# Patient Record
Sex: FEMALE | Race: WHITE | Employment: FULL TIME
[De-identification: names, ages, dates, MRNs, and addresses within clinical notes are randomized per-mention and may not be internally consistent; named-entity substitution may affect disease eponyms.]

---

## 2017-07-29 ENCOUNTER — HEALTH MAINTENANCE LETTER (OUTPATIENT)
Age: 52
End: 2017-07-29

## 2017-11-11 ENCOUNTER — HEALTH MAINTENANCE LETTER (OUTPATIENT)
Age: 52
End: 2017-11-11

## 2018-04-02 ASSESSMENT — PATIENT HEALTH QUESTIONNAIRE - PHQ9
10. IF YOU CHECKED OFF ANY PROBLEMS, HOW DIFFICULT HAVE THESE PROBLEMS MADE IT FOR YOU TO DO YOUR WORK, TAKE CARE OF THINGS AT HOME, OR GET ALONG WITH OTHER PEOPLE: NOT DIFFICULT AT ALL
SUM OF ALL RESPONSES TO PHQ QUESTIONS 1-9: 7
SUM OF ALL RESPONSES TO PHQ QUESTIONS 1-9: 7

## 2018-04-03 ASSESSMENT — PATIENT HEALTH QUESTIONNAIRE - PHQ9: SUM OF ALL RESPONSES TO PHQ QUESTIONS 1-9: 7

## 2018-04-05 ENCOUNTER — OFFICE VISIT (OUTPATIENT)
Dept: FAMILY MEDICINE | Facility: CLINIC | Age: 53
End: 2018-04-05
Payer: COMMERCIAL

## 2018-04-05 VITALS
TEMPERATURE: 98.2 F | WEIGHT: 155 LBS | DIASTOLIC BLOOD PRESSURE: 68 MMHG | HEART RATE: 96 BPM | HEIGHT: 61 IN | SYSTOLIC BLOOD PRESSURE: 98 MMHG | BODY MASS INDEX: 29.27 KG/M2

## 2018-04-05 DIAGNOSIS — Z13.1 SCREENING FOR DIABETES MELLITUS: ICD-10-CM

## 2018-04-05 DIAGNOSIS — Z12.31 VISIT FOR SCREENING MAMMOGRAM: ICD-10-CM

## 2018-04-05 DIAGNOSIS — Z23 NEED FOR PROPHYLACTIC VACCINATION WITH TETANUS-DIPHTHERIA (TD): ICD-10-CM

## 2018-04-05 DIAGNOSIS — Z00.00 ROUTINE HISTORY AND PHYSICAL EXAMINATION OF ADULT: Primary | ICD-10-CM

## 2018-04-05 DIAGNOSIS — F17.200 TOBACCO USE DISORDER: ICD-10-CM

## 2018-04-05 DIAGNOSIS — Z12.11 SCREEN FOR COLON CANCER: ICD-10-CM

## 2018-04-05 DIAGNOSIS — Z13.220 SCREENING FOR HYPERLIPIDEMIA: ICD-10-CM

## 2018-04-05 LAB
CHOLEST SERPL-MCNC: 238 MG/DL
DEPRECATED CALCIDIOL+CALCIFEROL SERPL-MC: 24 UG/L (ref 20–75)
GLUCOSE SERPL-MCNC: 92 MG/DL (ref 70–99)
HDLC SERPL-MCNC: 51 MG/DL
LDLC SERPL CALC-MCNC: 165 MG/DL
NONHDLC SERPL-MCNC: 187 MG/DL
TRIGL SERPL-MCNC: 111 MG/DL

## 2018-04-05 PROCEDURE — 36415 COLL VENOUS BLD VENIPUNCTURE: CPT | Performed by: NURSE PRACTITIONER

## 2018-04-05 PROCEDURE — 80061 LIPID PANEL: CPT | Performed by: NURSE PRACTITIONER

## 2018-04-05 PROCEDURE — 90714 TD VACC NO PRESV 7 YRS+ IM: CPT | Performed by: NURSE PRACTITIONER

## 2018-04-05 PROCEDURE — 99396 PREV VISIT EST AGE 40-64: CPT | Mod: 25 | Performed by: NURSE PRACTITIONER

## 2018-04-05 PROCEDURE — 82306 VITAMIN D 25 HYDROXY: CPT | Performed by: NURSE PRACTITIONER

## 2018-04-05 PROCEDURE — 90471 IMMUNIZATION ADMIN: CPT | Performed by: NURSE PRACTITIONER

## 2018-04-05 PROCEDURE — 82947 ASSAY GLUCOSE BLOOD QUANT: CPT | Performed by: NURSE PRACTITIONER

## 2018-04-05 NOTE — NURSING NOTE
Screening Questionnaire for Adult Immunization    Are you sick today?   Yes   Do you have allergies to medications, food, a vaccine component or latex?   No   Have you ever had a serious reaction after receiving a vaccination?   No   Do you have a long-term health problem with heart disease, lung disease, asthma, kidney disease, metabolic disease (e.g. diabetes), anemia, or other blood disorder?   No   Do you have cancer, leukemia, HIV/AIDS, or any other immune system problem?   No   In the past 3 months, have you taken medications that affect  your immune system, such as prednisone, other steroids, or anticancer drugs; drugs for the treatment of rheumatoid arthritis, Crohn s disease, or psoriasis; or have you had radiation treatments?   No   Have you had a seizure, or a brain or other nervous system problem?   No   During the past year, have you received a transfusion of blood or blood     products, or been given immune (gamma) globulin or antiviral drug?   No   For women: Are you pregnant or is there a chance you could become        pregnant during the next month?   No   Have you received any vaccinations in the past 4 weeks?   No     Immunization questionnaire answers were all negative.        Per orders of CHAVA WEBSTER, injection of Td given by Mulu Montes. Patient instructed to remain in clinic for 15 minutes afterwards, and to report any adverse reaction to me immediately.       Screening performed by Mulu Montes on 4/5/2018 at 8:46 AM.

## 2018-04-05 NOTE — Clinical Note
Please abstract the following data from this visit with this patient into the appropriate field in Epic:  Pap smear done on this date: 8/12/2016 (actual date), by this group: [facility], results were normal.

## 2018-04-05 NOTE — MR AVS SNAPSHOT
After Visit Summary   4/5/2018    Gui Zuniga    MRN: [id number]           Patient Information     Date Of Birth          1965        Visit Information        Provider Department      4/5/2018 7:40 AM Elza Bassett NP [facility]        Today's Diagnoses     Routine history and physical examination of adult    -  1    Screen for colon cancer        Visit for screening mammogram        Need for prophylactic vaccination with tetanus-diphtheria (TD)        Tobacco use disorder        Screening for hyperlipidemia        Screening for diabetes mellitus          Care Instructions           Preventive Health Recommendations  Female Ages 50 - 64    Yearly exam: See your health care provider every year in order to  o Review health changes.   o Discuss preventive care.    o Review your medicines if your doctor has prescribed any.      Get a Pap test every three years (unless you have an abnormal result and your provider advises testing more often).    If you get Pap tests with HPV test, you only need to test every 5 years, unless you have an abnormal result.     You do not need a Pap test if your uterus was removed (hysterectomy) and you have not had cancer.    You should be tested each year for STDs (sexually transmitted diseases) if you're at risk.     Have a mammogram every 1 to 2 years.    Have a colonoscopy at age 50, or have a yearly FIT test (stool test). These exams screen for colon cancer.      Have a cholesterol test every 5 years, or more often if advised.    Have a diabetes test (fasting glucose) every three years. If you are at risk for diabetes, you should have this test more often.     If you are at risk for osteoporosis (brittle bone disease), think about having a bone density scan (DEXA).    Shots: Get a flu shot each year. Get a tetanus shot every 10 years.    Nutrition:     Eat at least 5 servings of fruits and vegetables each day.    Eat whole-grain bread,  whole-wheat pasta and brown rice instead of white grains and rice.    Talk to your provider about Calcium and Vitamin D.     Lifestyle    Exercise at least 150 minutes a week (30 minutes a day, 5 days a week). This will help you control your weight and prevent disease.    Limit alcohol to one drink per day.    No smoking.     Wear sunscreen to prevent skin cancer.     See your dentist every six months for an exam and cleaning.    See your eye doctor every 1 to 2 years.      Learning to [place]      Coping is the key to successfully living a life without cigarettes.    You have unconsciously connected smoking with many behaviors and feeling that you experience every day of your life.  Engaging in that behavior or experiencing that feeling automatically triggers a desire for a cigarette. Unless you do something to prevent those urges from occurring and learn to deal with the urges that do occur, you may be tempted back to smoking. Coping breaks all those connections and allows you to live a life free of cigarettes.  Coping does not mean that you have to completely stop living your life and join a monastery! It does mean that you must work at changing how you do many of the routines that prompt you to smoke. It also means changing how you think in those tempting situations.  These techniques are all simple and doable. But they are powerful. Research and practical experience has proven time and again that these techniques help to eliminate urges as well as give you the tools to deal with urges that manage to slip through.  Throughout the next few pages you will find literally hundreds of suggestions on how to deal with situations that trigger most smokers to smoke.  Think about the situations where you have been especially tempted to smoke in the past. Refer to the coping suggestions for each of those situations. Then, determine the best coping choices for you. These techniques will be your  weapons of choice  the next  time you encounter that situation. It is important to pick one coping technique to change what you do and one to change how you think for each trigger. Combining techniques makes them even stronger and increases your ability to successfully cope.  Even though there are plenty of excellent coping suggestions here, these are by no means all the techniques that exist. So, if you have an idea that s not listed here don t be afraid to use it. Be creative!  Finally remember that no matter how many excellent ideas you come up with you must actually put them into practice. Work at this for at least six to eight weeks and you ll quickly learn to deal with any tempting situation that may come along!        Coping Menu    Preventing Urges    There are many things you can do before you get into a tempting situation to eliminate the urge to smoke.    Visualize yourself comfortably dealing with the situation without a cigarette.    Plan ahead. Know what you will do in any given situation before you encounter it. Practice that plan often.    Avoid the situation until you feel you can deal with it.    Change the routines you associate with smoking as much as possible.    Rethink your belief that smoking somehow makes your life better or helps you deal with all your problems.    Begin an exercise program. If you can t do anything else just walk as briskly as you can every day for half an hour.    Keep yourself busy. Avoid boring situations where you may begin to think about smoking.    Remind yourself often that you are happy being a nonsmoker and that life is much better without cigarettes.  Coping with temptation  However, sometimes the urge manages to come through. You must be ready to cope with that urge as it s happening. The following suggestions will help you deal with that urge so you aren t tempted back to cigarettes.  General Suggestions    Deep Breathing. Every time an urge hits take in a slow deep breath, hold it for  three to five seconds and then slowly exhale.    Drink a glass of water.    Talk about the urge. Call your support person or let people around you know you need to talk for a few minutes.    Escape the situation. Leave until you feel comfortable going back.    Picture a stop sign in your head or say the word loudly to yourself.    Count to twenty!    Say to yourself,  I am in control  or  I can get through this.     Just accept the though. It s natural that you will have thoughts about cigarettes once you quit. Don t make a big deal out of them. Say to yourself  So what  and let the thought go.    Specific Situations  After Meals    Get up from the table as soon as you are done eating    Brush your teeth after every meal    Always sit in the nonsmoking section of a restaurant    At home have dessert and coffee in a different place from dinner    Take a short walk after each meal  Alcohol    Explore alternative ways to socialize with friends  o Go to a movie  o Work out together  o Have a party without alcohol    If you choose to drink  o Change what you usually drink  o Limit yourself to one or two drinks  o Talk about the urges when they occur  o Leave the bar periodically for fresh air (Do some deep breathing while outside).    Decide not to go to a bar for at least the first few weeks of your quit    Remind yourself that you can have fun without drinking. Millions of people do it all the time!  Boredom    Always carry a book/newspaper/crossword puzzle with you    Plan ahead so that you will not have long periods of inactivity    Learn to enjoy doing nothing from time to time. You do not always have to be doing something important    Use idle time to make the grocery list, plan your schedule or write letters    Start a new hobby or begin an exercise program to fill the time.  Breaks    Take your break at a different time    Change the place where you take your break    Take a short walk instead of staying  indoors    Do a crossword puzzle or read a novel    Realize that you don t need an excuse to take a break. You deserve it!      Car    Choose a slightly different route for routine trips    Remove the ashtray from the car    Listen to a talk radio station or books on tape to keep your mind occupied    Use public transportation for the first few weeks after you quit    Change the environment in the car. Clean the entire interior; get new seat covers, put up a no smoking sign, etc.  Coffee    Drink a flavored coffee or a different brand    Drink coffee out of a glass, paper cup, or the good china you never use    Change where you have your coffee breaks at work    If you always have your morning coffee at home have it at a café or at work    Drink tea instead of coffee  Evenings    Find projects to do while at home. Clean out the basement, refinish furniture, etc.    Keep yourself occupied while watching TV. Do puzzles, make out the grocery list, read a magazine    Visit family or friends instead of staying at home    Begin a new hobby or volunteer at a worthwhile organization    Start an exercise program. If you can t do anything else, take a brisk half hour walk each night  Hand/Mouth    Use cinnamon sticks (the kind used for cider)    Suck on sugar free candy    Use straws/swizzle sticks/tooth picks    Chew strong tangy sugar free gum    Eat carrots or celery sticks  Living with another smoker    Negotiate with the other smoker about where and when he/she will smoke. Do not make demands    Have the other smoker keep his/her cigarettes where you will not be able to find them    Practice saying  No thank you, I don t  smoke   just in case someone offers you a cigarette    Don t drink alcohol or limit yourself to one or two drinks    Have a support person with you at the party    Stress Management    Separate cigarettes from the situation. Realize that smoking never made a situation any better or helped you deal with  it.    Step back, take a deep breath, and say to yourself,  I can handle this.  Then deal with the problem.    Strategize about how to handle stressful situations with friends, relatives or trusted clergy before encountering those situations.    Realize that every problem has a solution that does not involve smoking.    Begin an exercise program, take a formal stress management class or learn to meditate.   Telephone    Stand instead of sit    Move the location of the phone    If you don t already have one, get a portable phone or a cell phone    Hold the phone in the hand opposite of the one you usually use    Limit your time on the phone (use email instead)!  Thoughts about smoking    Just because you think about something does not mean you have to do it. Remember, if you did everything you ever thought about you would be in long-term right now!!    Don t focus on the thought. Distract yourself:  o Say to yourself  I am in control  and let the thought go  o Remind yourself of the benefits of quitting  o Think of the reason you quit. Focus on that  o Say the word stop or picture a stop sign    Accept the thoughts. You naturally will be thinking about cigarettes for a while after you quit. Say to yourself,  So what  and move on    See yourself in your mind s eye as a successful nonsmoker. Practice seeing yourself in all kinds of situations dealing effectively without smoking    Give the smoker one ashtray. They will keep this ashtray clean and out of your sight when not in use    Determine a reasonable length of time for these changes    Surprise the smoker with a special dinner or gift at the end of your first month of quitting as a thank you for their cooperation.  Morning Routine    Change the order of your routine    Jump into the shower as soon as you get up    Eat something for breakfast if you normally do not    If you listen to the radio turn on the TV or vice versa    Look into the mirror first thing each  morning and say,  I m proud to be a nonsmoker!   Negative Moods    Rethink your belief that cigarettes will calm or relax you    Ask yourself how a cigarette will make the situation any better    Do deep breathing throughout the day    As you do the deep breathing, think calming thoughts. Say to yourself,  I can get through this  or simply  I am calm.     Realize that smoking does not hurt anyone but yourself. Smoking is not a good way to  get back  at anyone or to punish someone you are angry with  Other Smokers    Avoid places where you know people are smoking for the first few weeks of your quit    Leave the scene from time to time if you have to be in a smoking environment    Politely explain to the person that you are trying to quit and ask them not to smoke around you    Ask yourself what is still appealing about seeing other people smoke    Realize that the smoker is not happier or having more fun than you are just because they are smoking  Parties/Socializing    Before you go develop and practice a plan to deal with situations    Rehearse going to the function. Close your eyes and see yourself having a good time, meeting people, and enjoying the music all without a cigarette  Weight Gain    Do not diet.  Attempting two major behavior changes at the same time usually leads to failure at both. Wait at least two or three months after quitting before tackling any weight loss program.    Remember, the average weight gain, as a direct result of quitting, is only five to seven pounds. Any weight gain over and above that is due to behavioral changes on the part of the quitter    Drink six to eight glasses of water a day    Begin a modest exercise program. If you can do nothing else, take a brisk half hour walk every day    Remember, smoking does not turn your body into a fat burning machine. If it did, every smoker would be about 100 pounds!!!        Work    Rearrange your office or work space if you can    Put a   No Smoking  sign or motivation poster in your work area    Change your work routine as much as possible    Listen to music, talk radio, or tapes    Have a support person at work  [facility]   Discharged by : Mulu Montes CMA  Paper scripts provided to patient : no       If you have any questions regarding your visit please contact your care team:     Team Gold                Clinic Hours Telephone Number     Dr. Christie Bassett NP 7am-7pm  Monday - Thursday   7am-5pm  Fridays  [phone]   (Appointment scheduling available 24/7)     RN Line  [phone] option 2     Urgent Care - [place] - 11am-9pm Monday-Friday Saturday-Sunday- 9am-5pm     [place] -   5pm-9pm Monday-Friday Saturday-Sunday- 9am-5pm    [phone] - [place]    [phone] - [place]       For a Price Quote for your services, please call our Consumer Price Line at [phone].     What options do I have for visits at the clinic other than the traditional office visit?     To expand how we care for you, many of our providers are utilizing electronic visits (e-visits) and telephone visits, when medically appropriate, for interactions with their patients rather than a visit in the clinic. We also offer nurse visits for many medical concerns. Just like any other service, we will bill your insurance company for this type of visit based on time spent on the phone with your provider. Not all insurance companies cover these visits. Please check with your medical insurance if this type of visit is covered. You will be responsible for any charges that are not paid by your insurance.   E-visits via Avenso: generally incur a $35.00 fee.     Telephone visits:   Time spent on the phone: *charged based on time that is spent on the phone in increments of 10 minutes. Estimated cost:   5-10 mins $30.00   11-20 mins.  $59.00   21-30 mins. $85.00     Use NumberFour (secure email communication and access to your chart) to send your primary care provider a message or make an appointment. Ask someone on your Team how to sign up for NumberFour.     As always, Thank you for trusting us with your health care needs!      [facility]:    Scheduling Appointments  [facility]  Call: [phone]    [place]  Call: [phone]    [facility]  Call: [phone]    For Gastroenterology referrals   [facility]   Clinics and Surgery Center, 4th Floor   [address] [place] [address]   Appointments: [phone]    WHERE TO GO FOR CARE?  Clinic    Make an appointment if you:       Are sick (cold, cough, flu, sore throat, earache or in pain).       Have a small injury (sprain, small cut, burn or broken bone).       Need a physical exam, Pap smear, vaccine or prescription refill.       Have questions about your health or medicines.    To reach us:      Call [phone] ([phone]). Open 24 hours every day. (For counseling services, call [phone].)    Log into NumberFour at [email or web address]. (Visit [email or web address] to create an account.) Hospital emergency room    An emergency is a serious or life- threatening problem that must be treated right away.    Call 604 or get to the hospital if you have:      Very bad or sudden:            - Chest pain or pressure         - Bleeding         - Head or belly pain         - Dizziness or trouble seeing, walking or                          Speaking      Problems breathing      Blood in your vomit or you are coughing up blood      A major injury (knocked out, loss of a finger or limb, rape, broken bone protruding from skin)    A mental health crisis. (Or call the Mental Health Crisis line at 1-152.773.8503 or Suicide Prevention Hotline at 1-911.359.1737.)    Open 24 hours every  day. You don't need an appointment.     Urgent care    Visit urgent care for sickness or small injuries when the clinic is closed. You don't need an appointment. To check hours or find an urgent care near you, visit [email or web address]. Online care    Get online care from [facility] for more than 70 common problems, like colds, allergies and infections. Open 24 hours every day at:   [email or web address]   Need help deciding?    For advice about where to be seen, you may call your clinic and ask to speak with a nurse. We're here for you 24 hours every day.         If you are deaf or hard of hearing, please let us know. We provide many free services including sign language interpreters, oral interpreters, TTYs, telephone amplifiers, note takers and written materials.                    Follow-ups after your visit        Additional Services     GASTROENTEROLOGY ADULT REF PROCEDURE ONLY [facility] [phone]       Last Lab Result: No results found for: CR  Body mass index is 29.77 kg/(m^2).     Needed:  No  Language:  English    Patient will be contacted to schedule procedure.     Please be aware that coverage of these services is subject to the terms and limitations of your health insurance plan.  Call member services at your health plan with any benefit or coverage questions.  Any procedures must be performed at a [place] facility OR coordinated by your clinic's referral office.    Please bring the following with you to your appointment:    (1) Any X-Rays, CTs or MRIs which have been performed.  Contact the facility where they were done to arrange for  prior to your scheduled appointment.    (2) List of current medications   (3) This referral request   (4) Any documents/labs given to you for this referral                  Follow-up notes from your care team     Return in about 1 year (around 4/5/2019) for Physical Exam.      Future tests that were ordered for you today     Open Future Orders         "Priority Expected Expires Ordered    MA SCREENING DIGITAL BILAT - Future  (s+30) Routine  4/5/2019 4/5/2018            Who to contact     If you have questions or need follow up information about today's clinic visit or your schedule please contact [facility] directly at [phone].  Normal or non-critical lab and imaging results will be communicated to you by MyChart, letter or phone within 4 business days after the clinic has received the results. If you do not hear from us within 7 days, please contact the clinic through prettysecretshart or phone. If you have a critical or abnormal lab result, we will notify you by phone as soon as possible.  Submit refill requests through Dormify or call your pharmacy and they will forward the refill request to us. Please allow 3 business days for your refill to be completed.          Additional Information About Your Visit        MyChart Information     Dormify gives you secure access to your electronic health record. If you see a primary care provider, you can also send messages to your care team and make appointments. If you have questions, please call your primary care clinic.  If you do not have a primary care provider, please call [phone] and they will assist you.        Care EveryWhere ID     This is your Care EveryWhere ID. This could be used by other organizations to access your [place] medical records  [id number]        Your Vitals Were     Pulse Temperature Height BMI (Body Mass Index)          96 98.2  F (36.8  C) (Oral) 5' 0.5\" (1.537 m) 29.77 kg/m2         Blood Pressure from Last 3 Encounters:   04/05/18 98/68   12/28/15 122/70   05/26/15 102/68    Weight from Last 3 Encounters:   04/05/18 155 lb (70.3 kg)   12/28/15 155 lb (70.3 kg)   05/26/15 150 lb (68 kg)              We Performed the Following     GASTROENTEROLOGY ADULT REF PROCEDURE ONLY [facility] [phone]     GLUCOSE     Lipid Profile (Chol, Trig, HDL, LDL calc)     Vitamin D " Deficiency        Primary Care Provider Office Phone # Fax #    Oniel Mendoza [phone] [address]  [facility] [address]        Equal Access to Services     HOLLI BOLES : Jones alcantar everardoo Soyoungali, waaxda luqadaha, qaybta kaalmada adepeteryada, elayne montejoadair yanezpeter quintero laHectorkirby antonio. So [facility] [phone].    ATENCIÓN: Si habla español, tiene a loera disposición servicios gratuitos de asistencia lingüística. Llame al [phone].    We comply with applicable federal civil rights laws and [place] laws. We do not discriminate on the basis of race, color, national origin, age, disability, sex, sexual orientation, or gender identity.            Thank you!     Thank you for choosing [facility]  for your care. Our goal is always to provide you with excellent care. Hearing back from our patients is one way we can continue to improve our services. Please take a few minutes to complete the written survey that you may receive in the mail after your visit with us. Thank you!             Your Updated Medication List - Protect others around you: Learn how to safely use, store and throw away your medicines at www.disposemymeds.org.      Notice  As of 4/5/2018  8:31 AM    You have not been prescribed any medications.

## 2018-04-05 NOTE — Clinical Note
Mammogram done on this date: 10/2016 (approximately), by this group: [facility], results were normal.  Pap smear done on this date: 10/2016 (approximately), by this group: [facility], results were normal.

## 2018-04-05 NOTE — PATIENT INSTRUCTIONS
Preventive Health Recommendations  Female Ages 50 - 64    Yearly exam: See your health care provider every year in order to  o Review health changes.   o Discuss preventive care.    o Review your medicines if your doctor has prescribed any.      Get a Pap test every three years (unless you have an abnormal result and your provider advises testing more often).    If you get Pap tests with HPV test, you only need to test every 5 years, unless you have an abnormal result.     You do not need a Pap test if your uterus was removed (hysterectomy) and you have not had cancer.    You should be tested each year for STDs (sexually transmitted diseases) if you're at risk.     Have a mammogram every 1 to 2 years.    Have a colonoscopy at age 50, or have a yearly FIT test (stool test). These exams screen for colon cancer.      Have a cholesterol test every 5 years, or more often if advised.    Have a diabetes test (fasting glucose) every three years. If you are at risk for diabetes, you should have this test more often.     If you are at risk for osteoporosis (brittle bone disease), think about having a bone density scan (DEXA).    Shots: Get a flu shot each year. Get a tetanus shot every 10 years.    Nutrition:     Eat at least 5 servings of fruits and vegetables each day.    Eat whole-grain bread, whole-wheat pasta and brown rice instead of white grains and rice.    Talk to your provider about Calcium and Vitamin D.     Lifestyle    Exercise at least 150 minutes a week (30 minutes a day, 5 days a week). This will help you control your weight and prevent disease.    Limit alcohol to one drink per day.    No smoking.     Wear sunscreen to prevent skin cancer.     See your dentist every six months for an exam and cleaning.    See your eye doctor every 1 to 2 years.      Learning to [place]      Coping is the key to successfully living a life without cigarettes.    You have unconsciously connected smoking with many behaviors  and feeling that you experience every day of your life.  Engaging in that behavior or experiencing that feeling automatically triggers a desire for a cigarette. Unless you do something to prevent those urges from occurring and learn to deal with the urges that do occur, you may be tempted back to smoking. Coping breaks all those connections and allows you to live a life free of cigarettes.  Coping does not mean that you have to completely stop living your life and join a monastery! It does mean that you must work at changing how you do many of the routines that prompt you to smoke. It also means changing how you think in those tempting situations.  These techniques are all simple and doable. But they are powerful. Research and practical experience has proven time and again that these techniques help to eliminate urges as well as give you the tools to deal with urges that manage to slip through.  Throughout the next few pages you will find literally hundreds of suggestions on how to deal with situations that trigger most smokers to smoke.  Think about the situations where you have been especially tempted to smoke in the past. Refer to the coping suggestions for each of those situations. Then, determine the best coping choices for you. These techniques will be your  weapons of choice  the next time you encounter that situation. It is important to pick one coping technique to change what you do and one to change how you think for each trigger. Combining techniques makes them even stronger and increases your ability to successfully cope.  Even though there are plenty of excellent coping suggestions here, these are by no means all the techniques that exist. So, if you have an idea that s not listed here don t be afraid to use it. Be creative!  Finally remember that no matter how many excellent ideas you come up with you must actually put them into practice. Work at this for at least six to eight weeks and you ll quickly  learn to deal with any tempting situation that may come along!        Coping Menu    Preventing Urges    There are many things you can do before you get into a tempting situation to eliminate the urge to smoke.    Visualize yourself comfortably dealing with the situation without a cigarette.    Plan ahead. Know what you will do in any given situation before you encounter it. Practice that plan often.    Avoid the situation until you feel you can deal with it.    Change the routines you associate with smoking as much as possible.    Rethink your belief that smoking somehow makes your life better or helps you deal with all your problems.    Begin an exercise program. If you can t do anything else just walk as briskly as you can every day for half an hour.    Keep yourself busy. Avoid boring situations where you may begin to think about smoking.    Remind yourself often that you are happy being a nonsmoker and that life is much better without cigarettes.  Coping with temptation  However, sometimes the urge manages to come through. You must be ready to cope with that urge as it s happening. The following suggestions will help you deal with that urge so you aren t tempted back to cigarettes.  General Suggestions    Deep Breathing. Every time an urge hits take in a slow deep breath, hold it for three to five seconds and then slowly exhale.    Drink a glass of water.    Talk about the urge. Call your support person or let people around you know you need to talk for a few minutes.    Escape the situation. Leave until you feel comfortable going back.    Picture a stop sign in your head or say the word loudly to yourself.    Count to twenty!    Say to yourself,  I am in control  or  I can get through this.     Just accept the though. It s natural that you will have thoughts about cigarettes once you quit. Don t make a big deal out of them. Say to yourself  So what  and let the thought go.    Specific Situations  After  Meals    Get up from the table as soon as you are done eating    Brush your teeth after every meal    Always sit in the nonsmoking section of a restaurant    At home have dessert and coffee in a different place from dinner    Take a short walk after each meal  Alcohol    Explore alternative ways to socialize with friends  o Go to a movie  o Work out together  o Have a party without alcohol    If you choose to drink  o Change what you usually drink  o Limit yourself to one or two drinks  o Talk about the urges when they occur  o Leave the bar periodically for fresh air (Do some deep breathing while outside).    Decide not to go to a bar for at least the first few weeks of your quit    Remind yourself that you can have fun without drinking. Millions of people do it all the time!  Boredom    Always carry a book/newspaper/crossword puzzle with you    Plan ahead so that you will not have long periods of inactivity    Learn to enjoy doing nothing from time to time. You do not always have to be doing something important    Use idle time to make the grocery list, plan your schedule or write letters    Start a new hobby or begin an exercise program to fill the time.  Breaks    Take your break at a different time    Change the place where you take your break    Take a short walk instead of staying indoors    Do a crossword puzzle or read a novel    Realize that you don t need an excuse to take a break. You deserve it!      Car    Choose a slightly different route for routine trips    Remove the ashtray from the car    Listen to a talk radio station or books on tape to keep your mind occupied    Use public transportation for the first few weeks after you quit    Change the environment in the car. Clean the entire interior; get new seat covers, put up a no smoking sign, etc.  Coffee    Drink a flavored coffee or a different brand    Drink coffee out of a glass, paper cup, or the good china you never use    Change where you have  your coffee breaks at work    If you always have your morning coffee at home have it at a café or at work    Drink tea instead of coffee  Evenings    Find projects to do while at home. Clean out the basement, refinish furniture, etc.    Keep yourself occupied while watching TV. Do puzzles, make out the grocery list, read a magazine    Visit family or friends instead of staying at home    Begin a new hobby or volunteer at a worthwhile organization    Start an exercise program. If you can t do anything else, take a brisk half hour walk each night  Hand/Mouth    Use cinnamon sticks (the kind used for cider)    Suck on sugar free candy    Use straws/swizzle sticks/tooth picks    Chew strong tangy sugar free gum    Eat carrots or celery sticks  Living with another smoker    Negotiate with the other smoker about where and when he/she will smoke. Do not make demands    Have the other smoker keep his/her cigarettes where you will not be able to find them    Practice saying  No thank you, I don t  smoke   just in case someone offers you a cigarette    Don t drink alcohol or limit yourself to one or two drinks    Have a support person with you at the party    Stress Management    Separate cigarettes from the situation. Realize that smoking never made a situation any better or helped you deal with it.    Step back, take a deep breath, and say to yourself,  I can handle this.  Then deal with the problem.    Strategize about how to handle stressful situations with friends, relatives or trusted clergy before encountering those situations.    Realize that every problem has a solution that does not involve smoking.    Begin an exercise program, take a formal stress management class or learn to meditate.   Telephone    Stand instead of sit    Move the location of the phone    If you don t already have one, get a portable phone or a cell phone    Hold the phone in the hand opposite of the one you usually use    Limit your time on the  phone (use email instead)!  Thoughts about smoking    Just because you think about something does not mean you have to do it. Remember, if you did everything you ever thought about you would be in prison right now!!    Don t focus on the thought. Distract yourself:  o Say to yourself  I am in control  and let the thought go  o Remind yourself of the benefits of quitting  o Think of the reason you quit. Focus on that  o Say the word stop or picture a stop sign    Accept the thoughts. You naturally will be thinking about cigarettes for a while after you quit. Say to yourself,  So what  and move on    See yourself in your mind s eye as a successful nonsmoker. Practice seeing yourself in all kinds of situations dealing effectively without smoking    Give the smoker one ashtray. They will keep this ashtray clean and out of your sight when not in use    Determine a reasonable length of time for these changes    Surprise the smoker with a special dinner or gift at the end of your first month of quitting as a thank you for their cooperation.  Morning Routine    Change the order of your routine    Jump into the shower as soon as you get up    Eat something for breakfast if you normally do not    If you listen to the radio turn on the TV or vice versa    Look into the mirror first thing each morning and say,  I m proud to be a nonsmoker!   Negative Moods    Rethink your belief that cigarettes will calm or relax you    Ask yourself how a cigarette will make the situation any better    Do deep breathing throughout the day    As you do the deep breathing, think calming thoughts. Say to yourself,  I can get through this  or simply  I am calm.     Realize that smoking does not hurt anyone but yourself. Smoking is not a good way to  get back  at anyone or to punish someone you are angry with  Other Smokers    Avoid places where you know people are smoking for the first few weeks of your quit    Leave the scene from time to time if  you have to be in a smoking environment    Politely explain to the person that you are trying to quit and ask them not to smoke around you    Ask yourself what is still appealing about seeing other people smoke    Realize that the smoker is not happier or having more fun than you are just because they are smoking  Parties/Socializing    Before you go develop and practice a plan to deal with situations    Rehearse going to the function. Close your eyes and see yourself having a good time, meeting people, and enjoying the music all without a cigarette  Weight Gain    Do not diet.  Attempting two major behavior changes at the same time usually leads to failure at both. Wait at least two or three months after quitting before tackling any weight loss program.    Remember, the average weight gain, as a direct result of quitting, is only five to seven pounds. Any weight gain over and above that is due to behavioral changes on the part of the quitter    Drink six to eight glasses of water a day    Begin a modest exercise program. If you can do nothing else, take a brisk half hour walk every day    Remember, smoking does not turn your body into a fat burning machine. If it did, every smoker would be about 100 pounds!!!        Work    Rearrange your office or work space if you can    Put a  No Smoking  sign or motivation poster in your work area    Change your work routine as much as possible    Listen to music, talk radio, or tapes    Have a support person at work  [facility]   Discharged by : Mulu Montes CMA  Paper scripts provided to patient : no       If you have any questions regarding your visit please contact your care team:     [facility]                Clinic Hours Telephone Number     Dr. Christie Bassett NP 7am-7pm  Monday - Thursday   7am-5pm  Fridays  [phone]   (Appointment scheduling available 24/7)     RN  Line  [phone] option 2     Urgent Care - [place] - 11am-9pm Monday-Friday Saturday-Sunday- 9am-5pm     [place] -   5pm-9pm Monday-Friday Saturday-Sunday- 9am-5pm    [phone] - [place]    [phone] - [place]       For a Price Quote for your services, please call our Consumer Price Line at [phone].     What options do I have for visits at the clinic other than the traditional office visit?     To expand how we care for you, many of our providers are utilizing electronic visits (e-visits) and telephone visits, when medically appropriate, for interactions with their patients rather than a visit in the clinic. We also offer nurse visits for many medical concerns. Just like any other service, we will bill your insurance company for this type of visit based on time spent on the phone with your provider. Not all insurance companies cover these visits. Please check with your medical insurance if this type of visit is covered. You will be responsible for any charges that are not paid by your insurance.   E-visits via [employer]: generally incur a $35.00 fee.     Telephone visits:   Time spent on the phone: *charged based on time that is spent on the phone in increments of 10 minutes. Estimated cost:   5-10 mins $30.00   11-20 mins. $59.00   21-30 mins. $85.00     Use [employer] (secure email communication and access to your chart) to send your primary care provider a message or make an appointment. Ask someone on your Team how to sign up for [employer].     As always, Thank you for trusting us with your health care needs!      [facility]:    Scheduling Appointments  [place]  Call: [phone]    [place] [facility]  Call: [phone]    [facility]  Call: [phone]    For Gastroenterology referrals   [facility]   Cl[facility] and [facility] Center, 4th Floor   [address] [place] [address]   Appointments: [phone]    WHERE TO GO FOR CARE?  Clinic    Make an appointment if you:       Are sick (cold, cough, flu, sore throat, earache or in pain).       Have a small injury (sprain, small cut, burn or broken bone).       Need a physical exam, Pap smear, vaccine or prescription refill.       Have questions about your health or medicines.    To reach us:      Call [phone] ([phone]). Open 24 hours every day. (For counseling services, call [phone].)    Log into [facility] [employer]. (Visit [employer] to create an account.) Hospital emergency room    An emergency is a serious or life- threatening problem that must be treated right away.    Call 365 or get to the hospital if you have:      Very bad or sudden:            - Chest pain or pressure         - Bleeding         - Head or belly pain         - Dizziness or trouble seeing, walking or                          Speaking      Problems breathing      Blood in your vomit or you are coughing up blood      A major injury (knocked out, loss of a finger or limb, rape, broken bone protruding from skin)    A mental health crisis. (Or call the Mental Health Crisis line at 1-439.901.6377 or Suicide Prevention Hotline at 1-366.785.1654.)    Open 24 hours every day. You don't need an appointment.     Urgent care    Visit urgent care for sickness or small injuries when the clinic is closed. You don't need an appointment. To check hours or find an urgent care near you, visit [email or web address]. Online care    Get online care from [facility] for more than 70 common problems, like colds, allergies and infections. Open 24 hours every day at:   [email or web address]   Need help deciding?    For advice about where to be seen, you may call your clinic and ask to speak with a nurse. We're here for you 24 hours every day.         If you are deaf or hard of hearing, please let us know. We provide many free services  including sign language interpreters, oral interpreters, TTYs, telephone amplifiers, note takers and written materials.

## 2018-04-05 NOTE — PROGRESS NOTES
SUBJECTIVE:   CC: Gui Zuniga is an 52 year old woman who presents for preventive health visit.     Physical   Annual:     Getting at least 3 servings of Calcium per day::  NO    Bi-annual eye exam::  Yes    Dental care twice a year::  NO    Sleep apnea or symptoms of sleep apnea::  None    Diet::  Regular (no restrictions)    Frequency of exercise::  1 day/week    Duration of exercise::  30-45 minutes    Taking medications regularly::  Yes    Medication side effects::  None    Additional concerns today::  YES            Mammogram done on this date: 10/2016 (approximately), by this group: [facility], results were normal.   Pap smear done on this date: 8/2016 (approximately), by this group: [facility], results were normal.   Recurrent abnormal paps even after 2 leep procedures.  She had a hysterectomy.  She will need paps every 3 years for 20 years after the hysterectomy then she can stop them.        Today's PHQ-2 Score:   PHQ-2 ( 1999 [employer]) 4/2/2018   Q1: Little interest or pleasure in doing things 2   Q2: Feeling down, depressed or hopeless 2   PHQ-2 Score 4   Q1: Little interest or pleasure in doing things More than half the days   Q2: Feeling down, depressed or hopeless More than half the days   PHQ-2 Score 4       Abuse: Current or Past(Physical, Sexual or Emotional)- No  Do you feel safe in your environment - Yes    Social History   Substance Use Topics     Smoking status: Current Every Day Smoker     Packs/day: 0.75     Years: 30.00     Types: Cigarettes     Smokeless tobacco: Never Used     Alcohol use No     Alcohol Use 4/2/2018   If you drink alcohol do you typically have greater than 3 drinks per day OR greater than 7 drinks per week? Not Applicable   No flowsheet data found.    Reviewed orders with patient.  Reviewed health maintenance and updated orders accordingly - Yes  BP Readings from Last 3 Encounters:   04/05/18 98/68   12/28/15 122/70   05/26/15 102/68    Wt Readings from  Last 3 Encounters:   04/05/18 155 lb (70.3 kg)   12/28/15 155 lb (70.3 kg)   05/26/15 150 lb (68 kg)                  Patient Active Problem List   Diagnosis     Strabismic amblyopia     Dysplasia of cervix     Tobacco use disorder     CARDIOVASCULAR SCREENING; LDL GOAL LESS THAN 160     Past Surgical History:   Procedure Laterality Date     BIOPSY OF BREAST, INCISIONAL  2002    RT/LT-benign     C AFF EYE MUSCLE SURG PROC UNLISTED  5 years of age    for amblyopia     CRANIOTOMY  infancy    craniosynastosis     HYSTERECTOMY, VAGINAL  1/2005    ovaries left-precancerous cells       Social History   Substance Use Topics     Smoking status: Current Every Day Smoker     Packs/day: 0.75     Years: 30.00     Types: Cigarettes     Smokeless tobacco: Never Used     Alcohol use No     Family History   Problem Relation Age of Onset     Adopted: Yes     Asthma Daughter      Angioedema Daughter      hereditary angioedema     Unknown/Adopted Mother      Unknown/Adopted Father      Anxiety Disorder Daughter      DIABETES No family hx of          No current outpatient prescriptions on file.     No Known Allergies    Patient over age 50, mutual decision to screen reflected in health maintenance.    Pertinent mammograms are reviewed under the imaging tab.  History of abnormal Pap smear: Status post hysterectomy. Pap still indicated.    Reviewed and updated as needed this visit by clinical staff  Tobacco  Allergies  Meds  Problems  Med Hx  Surg Hx  Fam Hx  Soc Hx          Reviewed and updated as needed this visit by Provider  Allergies  Meds  Problems            Review of Systems  C: NEGATIVE for fever, chills, change in weight  I: NEGATIVE for worrisome rashes, moles or lesions  E: NEGATIVE for vision changes or irritation  ENT: NEGATIVE for ear, mouth and throat problems  R: NEGATIVE for significant cough or SOB  B: NEGATIVE for masses, tenderness or discharge  CV: NEGATIVE for chest pain, palpitations or peripheral  "edema  GI: NEGATIVE for nausea, abdominal pain, heartburn, or change in bowel habits  : NEGATIVE for unusual urinary or vaginal symptoms. No vaginal bleeding.  M: NEGATIVE for significant arthralgias or myalgia  N: NEGATIVE for weakness, dizziness or paresthesias  P: NEGATIVE for changes in mood or affect      OBJECTIVE:   BP 98/68 (Cuff Size: Adult Regular)  Pulse 96  Temp 98.2  F (36.8  C) (Oral)  Ht 5' 0.5\" (1.537 m)  Wt 155 lb (70.3 kg)  BMI 29.77 kg/m2  Physical Exam  GENERAL: healthy, alert and no distress  EYES: Eyes grossly normal to inspection, PERRL and conjunctivae and sclerae normal  HENT: ear canals and TM's normal, nose and mouth without ulcers or lesions  NECK: no adenopathy, no asymmetry, masses, or scars and thyroid normal to palpation  RESP: lungs clear to auscultation - no rales, rhonchi or wheezes  BREAST: normal without masses, tenderness or nipple discharge and no palpable axillary masses or adenopathy  CV: regular rate and rhythm, normal S1 S2, no S3 or S4, no murmur, click or rub, no peripheral edema and peripheral pulses strong  ABDOMEN: soft, nontender, no hepatosplenomegaly, no masses and bowel sounds normal  MS: no gross musculoskeletal defects noted, no edema  SKIN: no suspicious lesions or rashes  NEURO: Normal strength and tone, mentation intact and speech normal  PSYCH: mentation appears normal, affect normal/bright    ASSESSMENT/PLAN:   1. Routine history and physical examination of adult    - Vitamin D Deficiency    2. Screen for colon cancer    - GASTROENTEROLOGY ADULT REF PROCEDURE ONLY [facility] [phone]    3. Visit for screening mammogram    - MA SCREENING DIGITAL BILAT - Future  (s+30); Future    4. Need for prophylactic vaccination with tetanus-diphtheria (TD)    - TD (ADULT, 7+) PRESERVE FREE  - ADMIN 1st VACCINE    5. Tobacco use disorder  - Counseled on smoking cessation, patient not ready to quit now.  Encouraged her to try to plan quitting and let me " "know if she wants pharmacologic help.    6. Screening for hyperlipidemia    - Lipid Profile (Chol, Trig, HDL, LDL calc)    7. Screening for diabetes mellitus    - GLUCOSE    COUNSELING:  Reviewed preventive health counseling, as reflected in patient instructions       Regular exercise       Healthy diet/nutrition       Colon cancer screening         reports that she has been smoking Cigarettes.  She has a 22.50 pack-year smoking history. She has never used smokeless tobacco.  Tobacco Cessation Action Plan: Information offered: Patient not interested at this time  Self help information given to patient  Estimated body mass index is 29.77 kg/(m^2) as calculated from the following:    Height as of this encounter: 5' 0.5\" (1.537 m).    Weight as of this encounter: 155 lb (70.3 kg).   Weight management plan: Discussed healthy diet and exercise guidelines and patient will follow up in 12 months in clinic to re-evaluate.    Counseling Resources:  ATP IV Guidelines  Pooled Cohorts Equation Calculator  Breast Cancer Risk Calculator  FRAX Risk Assessment  ICSI Preventive Guidelines  Dietary Guidelines for Americans, 2010  [employer]'s MyPlate  ASA Prophylaxis  Lung CA Screening    Elza Bassett NP  [facility]  Answers for HPI/ROS submitted by the patient on 4/2/2018   PHQ-2 Score: 4  If you checked off any problems, how difficult have these problems made it for you to do your work, take care of things at home, or get along with other people?: Not difficult at all  PHQ9 TOTAL SCORE: 7  "

## 2018-04-05 NOTE — Clinical Note
Please abstract the following data from this visit with this patient into the appropriate field in Epic:  Pap smear done on this date: 8/2016 (approximately), by this group: [facility], results were NILM.

## 2018-04-06 ENCOUNTER — HOSPITAL ENCOUNTER (OUTPATIENT)
Facility: AMBULATORY SURGERY CENTER | Age: 53
End: 2018-04-06
Attending: SURGERY
Payer: COMMERCIAL

## 2018-04-06 PROBLEM — E55.9 VITAMIN D INSUFFICIENCY: Status: ACTIVE | Noted: 2018-04-06

## 2018-04-06 NOTE — PROGRESS NOTES
Notified patient about results and plan via [employer] message.    Your fasting blood sugar (diabetic screening) is negative/normal.  Your Vitamin D level is 24 which is a bit low.  I recommend you take a daily Multivitamin and a daily Vitamin D 2,000 units daily- both of these you can get over the counter.  Your cholesterol levels are moderately elevated, and higher than 1 year ago.  Recommend reducing cholesterol intake, increasing physical activity to 30 minutes most days of the week.  If you would like a referral to our Dietitian to discuss lower cholesterol diet please let me know and I am happy to order for you.    Take care,    Elza Bassett, DNP, APRN, CNP

## 2018-04-07 ENCOUNTER — HEALTH MAINTENANCE LETTER (OUTPATIENT)
Age: 53
End: 2018-04-07

## 2018-04-20 ENCOUNTER — RADIANT APPOINTMENT (OUTPATIENT)
Dept: MAMMOGRAPHY | Facility: CLINIC | Age: 53
End: 2018-04-20
Payer: COMMERCIAL

## 2018-04-20 DIAGNOSIS — Z12.31 VISIT FOR SCREENING MAMMOGRAM: ICD-10-CM

## 2018-04-20 PROCEDURE — 77067 SCR MAMMO BI INCL CAD: CPT | Mod: TC

## 2018-04-20 PROCEDURE — 77063 BREAST TOMOSYNTHESIS BI: CPT | Mod: TC

## 2018-04-25 ENCOUNTER — MYC MEDICAL ADVICE (OUTPATIENT)
Dept: FAMILY MEDICINE | Facility: CLINIC | Age: 53
End: 2018-04-25

## 2018-04-25 DIAGNOSIS — Z12.11 SPECIAL SCREENING FOR MALIGNANT NEOPLASMS, COLON: Primary | ICD-10-CM

## 2018-04-25 NOTE — TELEPHONE ENCOUNTER
My Chart message sent to patient. Will keep encounter open in case of a reply and to make sure the message was read. May close after message is read.  Brandy Mancuso RN

## 2018-05-15 ENCOUNTER — MYC MEDICAL ADVICE (OUTPATIENT)
Dept: FAMILY MEDICINE | Facility: CLINIC | Age: 53
End: 2018-05-15

## 2018-05-15 NOTE — LETTER
[facility]  [address] [place] [id number] [phone]                                                                                                May 22, 2018    Gui Zuniga  [address]   [facility] [address]        Dear Ms. Zuniga,    In order to ensure we are providing the best quality care, we have reviewed your chart and see that you are due for:    1  FIT (stool card)    Please call the clinic at your earliest convenience to schedule a LAB ONLY appointment to come  a card. Or if you already picked up a card, complete that and send in.    Thank you for trusting us with your health care.    Sincerely,    Elza Bassett, MARY, APRN, CNP/mv

## 2018-05-15 NOTE — TELEPHONE ENCOUNTER
Panel Management Review      Patient has the following on her problem list: None      Composite cancer screening  Chart review shows that this patient is due/due soon for the following Fecal Colorectal (FIT)  Summary:    Patient is due/failing the following:   FIT    Action needed:   Patient needs office visit for LAB ONLY to  FIT or if she already did then she needs to complete and send in.    Type of outreach:    Sent [employer] message.    Questions for provider review:    None                                                                                                                                      Astrid Taylor MA       Chart routed to Care Team .

## 2018-09-14 ENCOUNTER — ALLIED HEALTH/NURSE VISIT (OUTPATIENT)
Dept: NURSING | Facility: CLINIC | Age: 53
End: 2018-09-14
Payer: COMMERCIAL

## 2018-09-14 DIAGNOSIS — Z11.1 SCREENING EXAMINATION FOR PULMONARY TUBERCULOSIS: Primary | ICD-10-CM

## 2018-09-14 PROCEDURE — 86580 TB INTRADERMAL TEST: CPT

## 2018-09-14 NOTE — MR AVS SNAPSHOT
After Visit Summary   9/14/2018    Gui Zuniga    MRN: [id number]           Patient Information     Date Of Birth          1965        Visit Information        Provider Department      9/14/2018 10:30 AM NE ANCILLARY [facility]        Today's Diagnoses     Screening examination for pulmonary tuberculosis    -  1       Follow-ups after your visit        Your next 10 appointments already scheduled     Sep 17, 2018  9:00 AM CDT   Nurse Only with NE ANCILLARY   [facility]    [address] [place] [id number] [phone]              Who to contact     If you have questions or need follow up information about today's clinic visit or your schedule please contact [facility] directly at [phone].  Normal or non-critical lab and imaging results will be communicated to you by MyChart, letter or phone within 4 business days after the clinic has received the results. If you do not hear from us within 7 days, please contact the clinic through MyChart or phone. If you have a critical or abnormal lab result, we will notify you by phone as soon as possible.  Submit refill requests through [employer] or call your pharmacy and they will forward the refill request to us. Please allow 3 business days for your refill to be completed.          Additional Information About Your Visit        MyChart Information     [employer] gives you secure access to your electronic health record. If you see a primary care provider, you can also send messages to your care team and make appointments. If you have questions, please call your primary care clinic.  If you do not have a primary care provider, please call [phone] and they will assist you.        Care EveryWhere ID     This is your Care EveryWhere ID. This could be used by other organizations to access your [facility] medical records  [id number]         Blood  Pressure from Last 3 Encounters:   04/05/18 98/68   12/28/15 122/70   05/26/15 102/68    Weight from Last 3 Encounters:   04/05/18 155 lb (70.3 kg)   12/28/15 155 lb (70.3 kg)   05/26/15 150 lb (68 kg)              We Performed the Following     TB INTRADERMAL TEST        Primary Care Provider Office Phone # Fax #    Oniel Mendoza [phone]       [address]  [place] [address]        Equal Access to Services     [facility]: Hadii aad ku hadasho Soomaali, waaxda luqadaha, qaybta kaalmada adeegyaaden, elayne cummings . So [facility] [phone].    ATENCIÓN: Si habla español, tiene a loera disposición servicios gratuitos de asistencia lingüística. Ll[facility] [phone].    We comply with applicable federal civil rights laws and [place] laws. We do not discriminate on the basis of race, color, national origin, age, disability, sex, sexual orientation, or gender identity.            Thank you!     Thank you for choosing [facility]  for your care. Our goal is always to provide you with excellent care. Hearing back from our patients is one way we can continue to improve our services. Please take a few minutes to complete the written survey that you may receive in the mail after your visit with us. Thank you!             Your Updated Medication List - Protect others around you: Learn how to safely use, store and throw away your medicines at www.disposemymeds.org.      Notice  As of 9/14/2018 10:51 AM    You have not been prescribed any medications.

## 2018-09-14 NOTE — PROGRESS NOTES
The patient is asked the following questions today and these are her answers:    -Have you had a mantoux administered in the past 30 days?    No  -Have you had a previous positive Mantoux.  No  -Have you received BCG in the past.  No  -Have you had a live vaccine  (MMR, Varicella, OPV, Yellow Fever) in the last 6 weeks.  No  -Have you had and active  viral or bacterial infection in the past 6 weeks.  No  -Have you received corticosteroids or immunosuppressive agents in the past 6 weeks.  No  -Have you been diagnosed with HIV?  No  -Do you have a maglinancy?  No     Prior to injection verified patient identity using patient's name and date of birth.  Due to injection administration, patient instructed to remain in clinic for 15 minutes  afterwards, and to report any adverse reaction to me immediately.       Soni Taylor MA on 9/14/2018 at 10:50 AM

## 2018-09-17 ENCOUNTER — ALLIED HEALTH/NURSE VISIT (OUTPATIENT)
Dept: NURSING | Facility: CLINIC | Age: 53
End: 2018-09-17
Payer: COMMERCIAL

## 2018-09-17 DIAGNOSIS — Z11.1 SCREENING EXAMINATION FOR PULMONARY TUBERCULOSIS: Primary | ICD-10-CM

## 2018-09-17 LAB
PPDINDURATION: 0 MM (ref 0–5)
PPDREDNESS: 0 MM

## 2018-09-17 PROCEDURE — 99207 ZZC NO CHARGE NURSE ONLY: CPT

## 2018-09-20 ENCOUNTER — ALLIED HEALTH/NURSE VISIT (OUTPATIENT)
Dept: NURSING | Facility: CLINIC | Age: 53
End: 2018-09-20
Payer: COMMERCIAL

## 2018-09-20 DIAGNOSIS — L98.9 SKIN LESION: Primary | ICD-10-CM

## 2018-09-20 PROCEDURE — 99207 ZZC NO CHARGE NURSE ONLY: CPT

## 2018-09-20 NOTE — MR AVS SNAPSHOT
After Visit Summary   9/20/2018    Gui Zuniga    MRN: [id number]           Patient Information     Date Of Birth          1965        Visit Information        Provider Department      9/20/2018 10:30 AM NE RN [facility]        Today's Diagnoses     Skin lesion    -  1       Follow-ups after your visit        Who to contact     If you have questions or need follow up information about today's clinic visit or your schedule please contact [facility] directly at [phone].  Normal or non-critical lab and imaging results will be communicated to you by MyChart, letter or phone within 4 business days after the clinic has received the results. If you do not hear from us within 7 days, please contact the clinic through [employer] or phone. If you have a critical or abnormal lab result, we will notify you by phone as soon as possible.  Submit refill requests through [employer] or call your pharmacy and they will forward the refill request to us. Please allow 3 business days for your refill to be completed.          Additional Information About Your Visit        MyChart Information     [employer] gives you secure access to your electronic health record. If you see a primary care provider, you can also send messages to your care team and make appointments. If you have questions, please call your primary care clinic.  If you do not have a primary care provider, please call [phone] and they will assist you.        Care EveryWhere ID     This is your Care EveryWhere ID. This could be used by other organizations to access your [facility] medical records  [id number]         Blood Pressure from Last 3 Encounters:   04/05/18 98/68   12/28/15 122/70   05/26/15 102/68    Weight from Last 3 Encounters:   04/05/18 155 lb (70.3 kg)   12/28/15 155 lb (70.3 kg)   05/26/15 150 lb (68 kg)              Today, you had the following     No orders found for display       Primary Care  Provider Office Phone # Fax #    Oniel Mendoza [phone] [address]  [facility] [address]        Equal Access to Services     [facility] : Hadii margarita ku everardoo Soyoungali, waaxda luqadaha, qaybta kaalmada adeellyda, elayne quintero laHectorkirby antonio. So [facility] [phone].    ATENCIÓN: Si habla español, tiene a loera disposición servicios gratuitos de asistencia lingüística. Llame al [phone].    We comply with applicable federal civil rights laws and [place] laws. We do not discriminate on the basis of race, color, national origin, age, disability, sex, sexual orientation, or gender identity.            Thank you!     Thank you for choosing [facility]  for your care. Our goal is always to provide you with excellent care. Hearing back from our patients is one way we can continue to improve our services. Please take a few minutes to complete the written survey that you may receive in the mail after your visit with us. Thank you!             Your Updated Medication List - Protect others around you: Learn how to safely use, store and throw away your medicines at www.disposemymeds.org.      Notice  As of 9/20/2018 10:51 AM    You have not been prescribed any medications.

## 2018-09-20 NOTE — PROGRESS NOTES
Was here on Monday thought her mantoux became positive has 5 mm of redness. No induration. Mantoux negative patient reassured.  Jocelyn Zepeda,Clinic Rn  [facility]

## 2019-07-16 ENCOUNTER — OFFICE VISIT (OUTPATIENT)
Dept: ORTHOPEDICS | Facility: CLINIC | Age: 54
End: 2019-07-16

## 2019-07-16 VITALS
DIASTOLIC BLOOD PRESSURE: 81 MMHG | HEIGHT: 60 IN | BODY MASS INDEX: 26.5 KG/M2 | HEART RATE: 90 BPM | SYSTOLIC BLOOD PRESSURE: 113 MMHG | RESPIRATION RATE: 16 BRPM | WEIGHT: 135 LBS

## 2019-07-16 DIAGNOSIS — S52.532A CLOSED COLLES' FRACTURE OF LEFT RADIUS, INITIAL ENCOUNTER: ICD-10-CM

## 2019-07-16 PROCEDURE — 25600 CLTX DST RDL FX/EPHYS SEP WO: CPT | Mod: LT | Performed by: ORTHOPAEDIC SURGERY

## 2019-07-16 ASSESSMENT — MIFFLIN-ST. JEOR: SCORE: 1138.86

## 2019-07-16 NOTE — PROGRESS NOTES
Gui Zuniga is a 54 year old female seen as self referral for left distal radius fracture.    She sustained this on 7/12/19 when she  fell down stairs at home.    The arm was placed into a short arm velcro splint without reduction.   She is now seen for definitive treatment.    Other injuries: None.    Past Medical History:   Diagnosis Date     Dysplasia of cervix, unspecified      Hyperlipidemia      Strabismic amblyopia     OD with visual loss        Past Surgical History:   Procedure Laterality Date     BIOPSY OF BREAST, INCISIONAL  2002    RT/LT-benign     C AFF EYE MUSCLE SURG PROC UNLISTED  5 years of age    for amblyopia     CRANIOTOMY  infancy    craniosynastosis     HYSTERECTOMY, VAGINAL  1/2005    ovaries left-precancerous cells       Family History   Adopted: Yes   Problem Relation Age of Onset     Asthma Daughter      Angioedema Daughter         hereditary angioedema     Unknown/Adopted Mother      Unknown/Adopted Father      Anxiety Disorder Daughter      Diabetes No family hx of        Social History     Socioeconomic History     Marital status:      Spouse name: Not on file     Number of children: Not on file     Years of education: Not on file     Highest education level: Not on file   Occupational History     Occupation: trust asset processor     Employer: [employer]     Comment: [employer]   Social Needs     Financial resource strain: Not on file     Food insecurity:     Worry: Not on file     Inability: Not on file     Transportation needs:     Medical: Not on file     Non-medical: Not on file   Tobacco Use     Smoking status: Current Every Day Smoker     Packs/day: 0.75     Years: 30.00     Pack years: 22.50     Types: Cigarettes     Smokeless tobacco: Never Used   Substance and Sexual Activity     Alcohol use: No     Drug use: No     Sexual activity: Yes     Partners: Male     Birth control/protection: Surgical     Comment: -TVH   Lifestyle     Physical  activity:     Days per week: Not on file     Minutes per session: Not on file     Stress: Not on file   Relationships     Social connections:     Talks on phone: Not on file     Gets together: Not on file     Attends Caodaism service: Not on file     Active member of club or organization: Not on file     Attends meetings of clubs or organizations: Not on file     Relationship status: Not on file     Intimate partner violence:     Fear of current or ex partner: Not on file     Emotionally abused: Not on file     Physically abused: Not on file     Forced sexual activity: Not on file   Other Topics Concern      Service No     Blood Transfusions No     Caffeine Concern No     Occupational Exposure No     Hobby Hazards No     Sleep Concern No     Stress Concern No     Weight Concern No     Special Diet No     Back Care No     Exercise Yes     Comment: 2-3 times per week     Bike Helmet No     Seat Belt Yes     Self-Exams Yes     Comment: Occ     Parent/sibling w/ CABG, MI or angioplasty before 65F 55M? No   Social History Narrative     Not on file       No current outpatient medications on file.       No Known Allergies    REVIEW OF SYSTEMS:  CONSTITUTIONAL:  NEGATIVE for fever, chills, change in weight, not feeling tired  SKIN:  NEGATIVE for worrisome rashes, no skin lumps, no skin ulcers and no non-healing wounds  EYES:  NEGATIVE for vision changes or irritation.  ENT/MOUTH:  NEGATIVE.  No hearing loss, no hoarseness, no difficulty swallowing.  RESP:  NEGATIVE. No cough or shortness of breath.  BREAST:  NEGATIVE for masses, tenderness or discharge  CV:  NEGATIVE for chest pain, palpitations or peripheral edema  GI:  NEGATIVE for nausea, abdominal pain, heartburn, or change in bowel habits  :  Negative. No dysuria, no hematuria  MUSCULOSKELETAL:  See HPI above  NEURO:  NEGATIVE . No headaches, no dizziness,  no numbness  ENDOCRINE:  NEGATIVE for temperature intolerance, skin/hair changes  HEME/ALLERGY/IMMUNE:   NEGATIVE for bleeding problems  PSYCHIATRIC:  NEGATIVE. no anxiety, no depression.         Xray images were independently visualized with the patient.  This shows distal radius fracture with no dorsal translation, moderate dorsal angulation.       Exam:    Shows moderate tenderness at left distal radius.  mild swelling of forearm, wrist and hand.  full range of motion of fingers.  Sensation, motor, and circulation are intact    Assessment/Plan:  Left distal radius fracture with moderate displacement.  This is acceptable position.  We placed a short arm fiberglass cast with Colles mold.  Return to clinic 6 weeks with xray out of cast.  Off work until 7/22/19.  Then return to work 8 hours / day with no aggressive use or gripping, no prolonged typing, with rest breaks as needed.

## 2019-07-16 NOTE — PROGRESS NOTES
CAST APPLICATION:  On today's visit a well padded Fiberglass short arm cast was applied to the left upper extremity. I use stockinette and cast padding prior to applying the fiberglass. I applied a mold for comfort and good fit.  I also did a three-point mold/Colles' mold to help the distal radius from falling dorsally.  The neurovascular status is unchanged after application and the patient stated that it was comfortable. Cast care was discussed.  Luis Alberto Szymanski PA-C

## 2019-07-16 NOTE — LETTER
7/16/2019         RE: Gui Zuniga  [address]  [facility] [address]        Dear Colleague,    Thank you for referring your patient, Gui Zuniga, to the [facility]. Please see a copy of my visit note below.    CAST APPLICATION:  On today's visit a well padded Fiberglass short arm cast was applied to the left upper extremity. I use stockinette and cast padding prior to applying the fiberglass. I applied a mold for comfort and good fit.  I also did a three-point mold/Colles' mold to help the distal radius from falling dorsally.  The neurovascular status is unchanged after application and the patient stated that it was comfortable. Cast care was discussed.  Luis Alberto Szymanski PA-C       Gui Zuniga is a 54 year old female seen as self referral for left distal radius fracture.    She sustained this on 7/12/19 when she  fell down stairs at home.    The arm was placed into a short arm velcro splint without reduction.   She is now seen for definitive treatment.    Other injuries: None.    Past Medical History:   Diagnosis Date     Dysplasia of cervix, unspecified      Hyperlipidemia      Strabismic amblyopia     OD with visual loss        Past Surgical History:   Procedure Laterality Date     BIOPSY OF BREAST, INCISIONAL  2002    RT/LT-benign     C AFF EYE MUSCLE SURG PROC UNLISTED  5 years of age    for amblyopia     CRANIOTOMY  infancy    craniosynastosis     HYSTERECTOMY, VAGINAL  1/2005    ovaries left-precancerous cells       Family History   Adopted: Yes   Problem Relation Age of Onset     Asthma Daughter      Angioedema Daughter         hereditary angioedema     Unknown/Adopted Mother      Unknown/Adopted Father      Anxiety Disorder Daughter      Diabetes No family hx of        Social History     Socioeconomic History     Marital status:      Spouse name: Not on file     Number of children: Not on file     Years of education: Not on file     Highest  education level: Not on file   Occupational History     Occupation: trust asset processor     Employer: [employer]     Comment: [employer]   Social Needs     Financial resource strain: Not on file     Food insecurity:     Worry: Not on file     Inability: Not on file     Transportation needs:     Medical: Not on file     Non-medical: Not on file   Tobacco Use     Smoking status: Current Every Day Smoker     Packs/day: 0.75     Years: 30.00     Pack years: 22.50     Types: Cigarettes     Smokeless tobacco: Never Used   Substance and Sexual Activity     Alcohol use: No     Drug use: No     Sexual activity: Yes     Partners: Male     Birth control/protection: Surgical     Comment: -TVH   Lifestyle     Physical activity:     Days per week: Not on file     Minutes per session: Not on file     Stress: Not on file   Relationships     Social connections:     Talks on phone: Not on file     Gets together: Not on file     Attends Faith service: Not on file     Active member of club or organization: Not on file     Attends meetings of clubs or organizations: Not on file     Relationship status: Not on file     Intimate partner violence:     Fear of current or ex partner: Not on file     Emotionally abused: Not on file     Physically abused: Not on file     Forced sexual activity: Not on file   Other Topics Concern      Service No     Blood Transfusions No     Caffeine Concern No     Occupational Exposure No     Hobby Hazards No     Sleep Concern No     Stress Concern No     Weight Concern No     Special Diet No     Back Care No     Exercise Yes     Comment: 2-3 times per week     Bike Helmet No     Seat Belt Yes     Self-Exams Yes     Comment: Occ     Parent/sibling w/ CABG, MI or angioplasty before 65F 55M? No   Social History Narrative     Not on file       No current outpatient medications on file.       No Known Allergies    REVIEW OF SYSTEMS:  CONSTITUTIONAL:  NEGATIVE for fever,  chills, change in weight, not feeling tired  SKIN:  NEGATIVE for worrisome rashes, no skin lumps, no skin ulcers and no non-healing wounds  EYES:  NEGATIVE for vision changes or irritation.  ENT/MOUTH:  NEGATIVE.  No hearing loss, no hoarseness, no difficulty swallowing.  RESP:  NEGATIVE. No cough or shortness of breath.  BREAST:  NEGATIVE for masses, tenderness or discharge  CV:  NEGATIVE for chest pain, palpitations or peripheral edema  GI:  NEGATIVE for nausea, abdominal pain, heartburn, or change in bowel habits  :  Negative. No dysuria, no hematuria  MUSCULOSKELETAL:  See HPI above  NEURO:  NEGATIVE . No headaches, no dizziness,  no numbness  ENDOCRINE:  NEGATIVE for temperature intolerance, skin/hair changes  HEME/ALLERGY/IMMUNE:  NEGATIVE for bleeding problems  PSYCHIATRIC:  NEGATIVE. no anxiety, no depression.         Xray images were independently visualized with the patient.  This shows distal radius fracture with no dorsal translation, moderate dorsal angulation.       Exam:    Shows moderate tenderness at left distal radius.  mild swelling of forearm, wrist and hand.  full range of motion of fingers.  Sensation, motor, and circulation are intact    Assessment/Plan:  Left distal radius fracture with moderate displacement.  This is acceptable position.  We placed a short arm fiberglass cast with Colles mold.  Return to clinic 6 weeks with xray out of cast.  Off work until 7/22/19.  Then return to work 8 hours / day with no aggressive use or gripping, no prolonged typing, with rest breaks as needed.      Again, thank you for allowing me to participate in the care of your patient.        Sincerely,        Dax Waller MD

## 2019-07-16 NOTE — LETTER
[facility]  [address]  [facility] [address]  Phone: [phone]  Fax: [phone]    July 16, 2019        Gui Zuniga  [address]   [facility] [address]          To whom it may concern:    RE: Gui Zuniga is under my care for left colles fracture.    Work related injury: No Date of injury: 7/12/19      Return to work date: 7/22/19   ** WITH RESTRICTIONS? Yes, with work restrictions: 8 hour days 5 days a week, no aggressive use or gripping with rest breaks as needed.      [facility] (Date): unknown     Next appointment: 6 weeks          Dax Waller M.D.

## 2019-07-16 NOTE — LETTER
WORKABILITY    [facility], Dr. Dax Waller M.D.  [facility]        7/16/2019      RE: Gui Zuniga    [address]   [facility] [address]        To whom it may concern:     Gui Zuniga is under my care for left colles fracture.    Work related injury: No Date of injury: 7/12/19      Return to work date: 7/22/19   ** WITH RESTRICTIONS? Yes, with work restrictions: 8 hour days 5 days a week, no aggressive use or gripping and no prolonged typing with rest breaks as needed.      Maximum Medical Improvement (Date): unknown     Next appointment: 6 weeks          Dax Waller M.D.

## 2019-07-17 ENCOUNTER — TELEPHONE (OUTPATIENT)
Dept: ORTHOPEDICS | Facility: CLINIC | Age: 54
End: 2019-07-17

## 2019-07-17 NOTE — TELEPHONE ENCOUNTER
Reason for Call:  Form, our goal is to have forms completed with 72 hours, however, some forms may require a visit or additional information.    Type of letter, form or note:  employer letter    Who is the form from?: Patient    Where did the request come from: Phone call    What number is listed as a contact on the form?: [phone] (home)     Additional comments:  Patient is requesting a note from Dr Waller that allows her to park her car near the building on surface parking. This would be from Monday, July 22- August 20, 2019, Patient normally has to park in the ramp and is uneasy about doing this because of her mobility with her cast.  Patient is requesting that the letter be emailed to her at [email or web address]    Call taken on 7/17/2019 at 2:06 PM by Kristina Alfonso

## 2019-07-17 NOTE — LETTER
WORKABILITY    [facility], Dr. Dax Waller M.D.  [facility]        7/18/2019      RE: Gui Zuniga    [address]   [facility] [address]        To whom it may concern:     Gui Zuniga is under my care for left distal radius fracture.  Work related injury: No   Date of injury: 7/12/19   Please allow patient to park near the building on parking surface starting 7/22/19 and ending 8/20/19 do to her injury.    Maximum Medical Improvement (Date): 6-8 weeks.    Next appointment: 6 weeks.          Dax Waller M.D.

## 2019-07-18 NOTE — TELEPHONE ENCOUNTER
Letter was sent to patients email at [email or web address] at her request.    Nicole Quinones MA

## 2019-08-20 ENCOUNTER — OFFICE VISIT (OUTPATIENT)
Dept: ORTHOPEDICS | Facility: CLINIC | Age: 54
End: 2019-08-20
Payer: COMMERCIAL

## 2019-08-20 ENCOUNTER — ANCILLARY PROCEDURE (OUTPATIENT)
Dept: GENERAL RADIOLOGY | Facility: CLINIC | Age: 54
End: 2019-08-20
Attending: ORTHOPAEDIC SURGERY
Payer: COMMERCIAL

## 2019-08-20 VITALS
DIASTOLIC BLOOD PRESSURE: 84 MMHG | SYSTOLIC BLOOD PRESSURE: 119 MMHG | HEIGHT: 60 IN | RESPIRATION RATE: 16 BRPM | HEART RATE: 78 BPM | WEIGHT: 135 LBS | BODY MASS INDEX: 26.5 KG/M2

## 2019-08-20 DIAGNOSIS — S52.532D CLOSED COLLES' FRACTURE OF LEFT RADIUS WITH ROUTINE HEALING, SUBSEQUENT ENCOUNTER: Primary | ICD-10-CM

## 2019-08-20 DIAGNOSIS — S52.532A CLOSED COLLES' FRACTURE OF LEFT RADIUS, INITIAL ENCOUNTER: ICD-10-CM

## 2019-08-20 PROCEDURE — 73110 X-RAY EXAM OF WRIST: CPT | Mod: LT

## 2019-08-20 PROCEDURE — 99207 ZZC FRACTURE CARE IN GLOBAL PERIOD: CPT | Performed by: ORTHOPAEDIC SURGERY

## 2019-08-20 ASSESSMENT — MIFFLIN-ST. JEOR: SCORE: 1138.86

## 2019-08-20 NOTE — PATIENT INSTRUCTIONS
Wrist Fracture Rehabilitation Exercises             You may do the stretching exercises when the sharp wrist pain goes away. You may do the strengthening exercises when stretching is nearly painless.     Stretching exercises   Make a fist.  Palm up, palm down.  Chopping.  Waving  Wrist Range of Motion   0. Flexion: Gently bend your wrist forward. Hold for 5 seconds. Do 3 sets of 10.   0. Extension: Gently bend your wrist backward. Hold this position 5 seconds. Do 3 sets of 10.   0. Side to side: Gently move your wrist from side to side (a handshake motion). Hold for 5 seconds in each direction. Do 3 sets of 10.   Wrist stretch: Press the back of the hand on your injured side with your other hand to help bend your wrist. Hold for 15 to 30 seconds. Next, stretch the hand back by pressing the fingers in a backward direction. Hold for 15 to 30 seconds. Keep the arm on your injured side straight during this exercise. Do 3 sets.   Wrist extension stretch: Stand at a table with your palms down, fingers flat, and elbows straight. Lean your body weight forward. Hold this position for 15 seconds. Repeat 3 times.   Wrist flexion stretch: Stand with the back of your hands on a table, palms facing up, fingers pointing toward your body, and elbows straight. Lean away from the table. Hold this position for 15 to 30 seconds. Repeat 3 times.   Forearm pronation and supination: Bend the elbow of your injured arm 90 degrees, keeping your elbow at your side. Turn your palm up and hold for 5 seconds. Then slowly turn your palm down and hold for 5 seconds. Make sure you keep your elbow at your side and bent 90 degrees while you do the exercise. Do 3 sets of 10. When this exercise becomes pain free, do it with some weight in your hand such   as a soup can or hammer handle.   Strengthening exercises   Wrist flexion: Hold a can or hammer handle in your hand with your palm facing up. Bend your wrist upward. Slowly  lower the weight and return to the starting position. Do 3 sets of 10. Gradually increase the weight of the can or weight you are holding.   Wrist extension: Hold a soup can or hammer handle in your hand with your palm facing down. Slowly bend your wrist up. Slowly lower the weight down into the starting position. Do 3 sets of 10. Gradually increase the weight of the object you are holding.    strengthening: Squeeze a soft rubber ball and hold the squeeze for 5 seconds. Do 3 sets of 10.     Published by [employer].  This content is reviewed periodically and is subject to change as new health information becomes available. The information is intended to inform and educate and is not a replacement for medical evaluation, advice, diagnosis or treatment by a healthcare professional.   Written by Renita Vogt MS, PT, and Es Dunlap PT, [facility], \[facility]\"", for [email or web address].     ? 2010 [facility] and/or its affiliates. All Rights Reserved.   Copyright   [employer] 2011  [employer]ult [employer]visor

## 2019-08-20 NOTE — LETTER
[facility]  [address]  [facility] [address]  Phone: [phone]  Fax: [phone]        August 20, 2019           Gui Zuniga  [address]   [facility] [address]              To whom it may concern:     RE: Gui Zuniga is under my care for left colles fracture.     Work related injury: No     Date of injury: 7/12/19       Return to work date: 7/22/19   ** WITH RESTRICTIONS? Yes, no heavy lifting, pushing or pulling.      Maximum Medical Improvement (Date):      Next appointment:            Dax Waller MD

## 2019-08-20 NOTE — LETTER
8/20/2019         RE: Gui Zuniga  [address]  [facility] [address]        Dear Colleague,    Thank you for referring your patient, Gui Zuniga, to the [facility]. Please see a copy of my visit note below.    Follow up left distal radius fracture from 7/12/19.  Cast is removed.  Xray:  Compared to previous films there has not been significant interval changes in position; alignment remains acceptable. Progressive healing is seen.  Exam shows no tenderness at distal radius.  She is quite stiff.    Range of Motion  Extension: 30 degrees  Flexion 30 degrees  Pronation 60 degrees.  Supination 60 degrees.  She is able  to make a fist.    Assessment/Plan  Gui is doing well and will start range of motion on her own.  Return to clinic 3-4 weeks for motion check and repeat left wrist x-ray.    Again, thank you for allowing me to participate in the care of your patient.        Sincerely,        Dax Waller MD

## 2019-08-20 NOTE — PROGRESS NOTES
Follow up left distal radius fracture from 7/12/19.  Cast is removed.  Xray:  Compared to previous films there has not been significant interval changes in position; alignment remains acceptable. Progressive healing is seen.  Exam shows no tenderness at distal radius.  She is quite stiff.    Range of Motion  Extension: 30 degrees  Flexion 30 degrees  Pronation 60 degrees.  Supination 60 degrees.  She is able  to make a fist.    Assessment/Plan  Gui is doing well and will start range of motion on her own.  Return to clinic 3-4 weeks for motion check and repeat left wrist x-ray.

## 2019-10-01 ENCOUNTER — ANCILLARY PROCEDURE (OUTPATIENT)
Dept: GENERAL RADIOLOGY | Facility: CLINIC | Age: 54
End: 2019-10-01
Attending: ORTHOPAEDIC SURGERY
Payer: COMMERCIAL

## 2019-10-01 ENCOUNTER — OFFICE VISIT (OUTPATIENT)
Dept: ORTHOPEDICS | Facility: CLINIC | Age: 54
End: 2019-10-01
Payer: COMMERCIAL

## 2019-10-01 VITALS — WEIGHT: 135 LBS | RESPIRATION RATE: 16 BRPM | BODY MASS INDEX: 26.5 KG/M2 | HEIGHT: 60 IN

## 2019-10-01 DIAGNOSIS — S52.532D CLOSED COLLES' FRACTURE OF LEFT RADIUS WITH ROUTINE HEALING, SUBSEQUENT ENCOUNTER: ICD-10-CM

## 2019-10-01 DIAGNOSIS — S52.532D CLOSED COLLES' FRACTURE OF LEFT RADIUS WITH ROUTINE HEALING, SUBSEQUENT ENCOUNTER: Primary | ICD-10-CM

## 2019-10-01 PROCEDURE — 73110 X-RAY EXAM OF WRIST: CPT | Mod: LT

## 2019-10-01 PROCEDURE — 99207 ZZC FRACTURE CARE IN GLOBAL PERIOD: CPT | Performed by: ORTHOPAEDIC SURGERY

## 2019-10-01 ASSESSMENT — MIFFLIN-ST. JEOR: SCORE: 1138.86

## 2019-10-01 NOTE — PROGRESS NOTES
Follow up left distal radius fracture from 7/12/19.  Xray:  Compared to previous films there has not been significant interval changes in position; alignment remains acceptable. Progressive healing is seen.  Fracture is healing well.  Exam shows no tenderness at distal radius.    Range of Motion  Extension: 90 degrees with mild pain.  Flexion 45 degrees  Pronation 90 degrees.  Supination 75 degrees.  She is able  to make a fist.    Assessment/Plan  Gui is doing well and will continue range of motion on her own.  Return to clinic as needed.

## 2019-10-01 NOTE — LETTER
10/1/2019         RE: Gui Zuniga  [address]  [facility] [address]        Dear Colleague,    Thank you for referring your patient, Gui Zuniga, to the [facility]. Please see a copy of my visit note below.    Follow up left distal radius fracture from 7/12/19.  Xray:  Compared to previous films there has not been significant interval changes in position; alignment remains acceptable. Progressive healing is seen.  Fracture is healing well.  Exam shows no tenderness at distal radius.    Range of Motion  Extension: 90 degrees with mild pain.  Flexion 45 degrees  Pronation 90 degrees.  Supination 75 degrees.  She is able  to make a fist.    Assessment/Plan  Gui is doing well and will continue range of motion on her own.  Return to clinic as needed.    Again, thank you for allowing me to participate in the care of your patient.        Sincerely,        Dax Waller MD

## 2019-11-04 ENCOUNTER — HEALTH MAINTENANCE LETTER (OUTPATIENT)
Age: 54
End: 2019-11-04

## 2020-02-02 ASSESSMENT — ENCOUNTER SYMPTOMS
NERVOUS/ANXIOUS: 0
HEMATURIA: 0
DIARRHEA: 0
FREQUENCY: 1
COUGH: 0
ABDOMINAL PAIN: 0
DIZZINESS: 0
FEVER: 0
EYE PAIN: 0
HEADACHES: 0
CONSTIPATION: 0
CHILLS: 0
HEMATOCHEZIA: 0
BREAST MASS: 0

## 2020-02-05 ENCOUNTER — MYC MEDICAL ADVICE (OUTPATIENT)
Dept: FAMILY MEDICINE | Facility: CLINIC | Age: 55
End: 2020-02-05

## 2020-02-05 ENCOUNTER — OFFICE VISIT (OUTPATIENT)
Dept: FAMILY MEDICINE | Facility: CLINIC | Age: 55
End: 2020-02-05
Payer: COMMERCIAL

## 2020-02-05 VITALS
BODY MASS INDEX: 28.27 KG/M2 | HEART RATE: 80 BPM | TEMPERATURE: 98.1 F | DIASTOLIC BLOOD PRESSURE: 60 MMHG | WEIGHT: 144 LBS | SYSTOLIC BLOOD PRESSURE: 98 MMHG | HEIGHT: 60 IN

## 2020-02-05 DIAGNOSIS — Z12.31 ENCOUNTER FOR SCREENING MAMMOGRAM FOR BREAST CANCER: ICD-10-CM

## 2020-02-05 DIAGNOSIS — F17.200 TOBACCO USE DISORDER: ICD-10-CM

## 2020-02-05 DIAGNOSIS — Z13.1 SCREENING FOR DIABETES MELLITUS: ICD-10-CM

## 2020-02-05 DIAGNOSIS — Z12.11 SPECIAL SCREENING FOR MALIGNANT NEOPLASMS, COLON: ICD-10-CM

## 2020-02-05 DIAGNOSIS — Z12.4 SCREENING FOR MALIGNANT NEOPLASM OF CERVIX: ICD-10-CM

## 2020-02-05 DIAGNOSIS — R53.83 FATIGUE, UNSPECIFIED TYPE: ICD-10-CM

## 2020-02-05 DIAGNOSIS — Z13.220 SCREENING FOR HYPERLIPIDEMIA: ICD-10-CM

## 2020-02-05 DIAGNOSIS — Z00.00 ROUTINE GENERAL MEDICAL EXAMINATION AT A HEALTH CARE FACILITY: Primary | ICD-10-CM

## 2020-02-05 LAB
ALBUMIN UR-MCNC: NEGATIVE MG/DL
APPEARANCE UR: CLEAR
BASOPHILS # BLD AUTO: 0 10E9/L (ref 0–0.2)
BASOPHILS NFR BLD AUTO: 0.3 %
BILIRUB UR QL STRIP: NEGATIVE
CHOLEST SERPL-MCNC: 211 MG/DL
COLOR UR AUTO: YELLOW
DEPRECATED CALCIDIOL+CALCIFEROL SERPL-MC: 46 UG/L (ref 20–75)
DIFFERENTIAL METHOD BLD: NORMAL
EOSINOPHIL # BLD AUTO: 0.1 10E9/L (ref 0–0.7)
EOSINOPHIL NFR BLD AUTO: 1.2 %
ERYTHROCYTE [DISTWIDTH] IN BLOOD BY AUTOMATED COUNT: 14.9 % (ref 10–15)
GLUCOSE SERPL-MCNC: 89 MG/DL (ref 70–99)
GLUCOSE UR STRIP-MCNC: NEGATIVE MG/DL
HCT VFR BLD AUTO: 46.5 % (ref 35–47)
HDLC SERPL-MCNC: 58 MG/DL
HGB BLD-MCNC: 14.9 G/DL (ref 11.7–15.7)
HGB UR QL STRIP: NEGATIVE
KETONES UR STRIP-MCNC: NEGATIVE MG/DL
LDLC SERPL CALC-MCNC: 135 MG/DL
LEUKOCYTE ESTERASE UR QL STRIP: NEGATIVE
LYMPHOCYTES # BLD AUTO: 2 10E9/L (ref 0.8–5.3)
LYMPHOCYTES NFR BLD AUTO: 34.4 %
MCH RBC QN AUTO: 29.4 PG (ref 26.5–33)
MCHC RBC AUTO-ENTMCNC: 32 G/DL (ref 31.5–36.5)
MCV RBC AUTO: 92 FL (ref 78–100)
MONOCYTES # BLD AUTO: 0.4 10E9/L (ref 0–1.3)
MONOCYTES NFR BLD AUTO: 7.2 %
NEUTROPHILS # BLD AUTO: 3.3 10E9/L (ref 1.6–8.3)
NEUTROPHILS NFR BLD AUTO: 56.9 %
NITRATE UR QL: NEGATIVE
NON-SQ EPI CELLS #/AREA URNS LPF: NORMAL /LPF
NONHDLC SERPL-MCNC: 153 MG/DL
PH UR STRIP: 5.5 PH (ref 5–7)
PLATELET # BLD AUTO: 318 10E9/L (ref 150–450)
RBC # BLD AUTO: 5.07 10E12/L (ref 3.8–5.2)
RBC #/AREA URNS AUTO: NORMAL /HPF
SOURCE: NORMAL
SP GR UR STRIP: 1.02 (ref 1–1.03)
TRIGL SERPL-MCNC: 92 MG/DL
TSH SERPL DL<=0.005 MIU/L-ACNC: 1.11 MU/L (ref 0.4–4)
UROBILINOGEN UR STRIP-ACNC: 0.2 EU/DL (ref 0.2–1)
WBC # BLD AUTO: 5.8 10E9/L (ref 4–11)
WBC #/AREA URNS AUTO: NORMAL /HPF

## 2020-02-05 PROCEDURE — 81001 URINALYSIS AUTO W/SCOPE: CPT | Performed by: NURSE PRACTITIONER

## 2020-02-05 PROCEDURE — G0145 SCR C/V CYTO,THINLAYER,RESCR: HCPCS | Performed by: NURSE PRACTITIONER

## 2020-02-05 PROCEDURE — 84443 ASSAY THYROID STIM HORMONE: CPT | Performed by: NURSE PRACTITIONER

## 2020-02-05 PROCEDURE — 85025 COMPLETE CBC W/AUTO DIFF WBC: CPT | Performed by: NURSE PRACTITIONER

## 2020-02-05 PROCEDURE — 87624 HPV HI-RISK TYP POOLED RSLT: CPT | Performed by: NURSE PRACTITIONER

## 2020-02-05 PROCEDURE — 36415 COLL VENOUS BLD VENIPUNCTURE: CPT | Performed by: NURSE PRACTITIONER

## 2020-02-05 PROCEDURE — 82306 VITAMIN D 25 HYDROXY: CPT | Performed by: NURSE PRACTITIONER

## 2020-02-05 PROCEDURE — 99396 PREV VISIT EST AGE 40-64: CPT | Performed by: NURSE PRACTITIONER

## 2020-02-05 PROCEDURE — 82947 ASSAY GLUCOSE BLOOD QUANT: CPT | Performed by: NURSE PRACTITIONER

## 2020-02-05 PROCEDURE — 80061 LIPID PANEL: CPT | Performed by: NURSE PRACTITIONER

## 2020-02-05 RX ORDER — POLYETHYLENE GLYCOL 3350 17 G
2 POWDER IN PACKET (EA) ORAL
Qty: 108 LOZENGE | Refills: 2 | Status: SHIPPED | OUTPATIENT
Start: 2020-02-05

## 2020-02-05 RX ORDER — ASCORBIC ACID 500 MG
500 TABLET ORAL DAILY
COMMUNITY

## 2020-02-05 RX ORDER — NICOTINE 21 MG/24HR
1 PATCH, TRANSDERMAL 24 HOURS TRANSDERMAL EVERY 24 HOURS
Qty: 42 PATCH | Refills: 0 | Status: SHIPPED | OUTPATIENT
Start: 2020-02-05

## 2020-02-05 RX ORDER — OMEGA-3/DHA/EPA/FISH OIL 60 MG-90MG
500 CAPSULE ORAL DAILY
COMMUNITY

## 2020-02-05 ASSESSMENT — ENCOUNTER SYMPTOMS
FREQUENCY: 1
CONSTIPATION: 0
ABDOMINAL PAIN: 0
FEVER: 0
NERVOUS/ANXIOUS: 0
DIARRHEA: 0
HEMATURIA: 0
DIZZINESS: 0
HEMATOCHEZIA: 0
COUGH: 0
EYE PAIN: 0
CHILLS: 0
HEADACHES: 0
BREAST MASS: 0

## 2020-02-05 ASSESSMENT — PAIN SCALES - GENERAL: PAINLEVEL: NO PAIN (0)

## 2020-02-05 ASSESSMENT — MIFFLIN-ST. JEOR: SCORE: 1179.68

## 2020-02-05 NOTE — PROGRESS NOTES
SUBJECTIVE:   CC: Gui Zuniga is an 54 year old woman who presents for preventive health visit.     Healthy Habits:     Getting at least 3 servings of Calcium per day:  NO    Bi-annual eye exam:  NO    Dental care twice a year:  NO    Sleep apnea or symptoms of sleep apnea:  Daytime drowsiness    Diet:  Regular (no restrictions)    Frequency of exercise:  2-3 days/week    Duration of exercise:  15-30 minutes    Taking medications regularly:  Yes    Medication side effects:  None    PHQ-2 Total Score: 0    Additional concerns today:  Yes    Tobacco use: She is smoking about 1/2 pack to 3/4 pack per day.  She wants to quit smoking.  Her goal is to quit 3/1/2020.  Her building in September will be tobacco free.    Fatigue: She complains of fatigue.  She is an early riser in about 4 AM and works at 7 AM.  On the weekends she feels exhausted and sleeps more.  Last year she had a low vitamin D.  She is taking vitamin D 2000 units daily.  She does not feel like she is depressed.  She is active in her [employer].  She just does not have the energy that she used to have decades ago.    Today's PHQ-2 Score:   PHQ-2 ( 1999 Pfizer) 2/2/2020   Q1: Little interest or pleasure in doing things 0   Q2: Feeling down, depressed or hopeless 0   PHQ-2 Score 0   Q1: Little interest or pleasure in doing things Not at all   Q2: Feeling down, depressed or hopeless Not at all   PHQ-2 Score 0       Abuse: Current or Past(Physical, Sexual or Emotional)- No  Do you feel safe in your environment? Yes        Social History     Tobacco Use     Smoking status: Current Every Day Smoker     Packs/day: 0.75     Years: 30.00     Pack years: 22.50     Types: Cigarettes     Smokeless tobacco: Never Used   Substance Use Topics     Alcohol use: No     If you drink alcohol do you typically have >3 drinks per day or >7 drinks per week? No    Alcohol Use 2/2/2020   Prescreen: >3 drinks/day or >7 drinks/week? No   Prescreen: >3 drinks/day or >7  drinks/week? -   No flowsheet data found.    Reviewed orders with patient.  Reviewed health maintenance and updated orders accordingly - Yes  BP Readings from Last 3 Encounters:   02/05/20 98/60   08/20/19 119/84   07/16/19 113/81    Wt Readings from Last 3 Encounters:   02/05/20 65.3 kg (144 lb)   10/01/19 61.2 kg (135 lb)   08/20/19 61.2 kg (135 lb)                  Patient Active Problem List   Diagnosis     Strabismic amblyopia     Dysplasia of cervix     Tobacco use disorder     CARDIOVASCULAR SCREENING; LDL GOAL LESS THAN 160     Hyperlipidemia     Vitamin D insufficiency     Closed Colles' fracture of left radius     Past Surgical History:   Procedure Laterality Date     BIOPSY OF BREAST, INCISIONAL  2002    RT/LT-benign     C AFF EYE MUSCLE SURG PROC UNLISTED  5 years of age    for amblyopia     CRANIOTOMY  infancy    craniosynastosis     HYSTERECTOMY, VAGINAL  1/2005    ovaries left-precancerous cells       Social History     Tobacco Use     Smoking status: Current Every Day Smoker     Packs/day: 0.75     Years: 30.00     Pack years: 22.50     Types: Cigarettes     Smokeless tobacco: Never Used   Substance Use Topics     Alcohol use: No     Family History   Adopted: Yes   Problem Relation Age of Onset     Asthma Daughter      Angioedema Daughter         hereditary angioedema     Unknown/Adopted Mother      Unknown/Adopted Father      Anxiety Disorder Daughter      Diabetes No family hx of          Current Outpatient Medications   Medication Sig Dispense Refill     Calcium Carbonate-Vit D-Min (CALCIUM 1200 PO) Take 1,200 mg by mouth       Cholecalciferol (VITAMIN D3 PO) Take 2,000 Units by mouth daily       nicotine (COMMIT) 2 MG lozenge Place 1 lozenge (2 mg) inside cheek every hour as needed for smoking cessation 108 lozenge 2     nicotine (NICODERM CQ) 14 MG/24HR 24 hr patch Place 1 patch onto the skin every 24 hours Daily for 6 weeks, then 7 mg patch daily for 2 weeks, then stop 42 patch 0     nicotine  (NICODERM CQ) 7 MG/24HR 24 hr patch Place 1 patch onto the skin every 24 hours After finishing the 14 patch use this 14 patch 0     Omega-3 Fatty Acids (FISH OIL) 500 MG CAPS Take 500 mg by mouth daily       Probiotic Product (PROBIOTIC PO) Take by mouth daily       vitamin B-12 (CYANOCOBALAMIN) 500 MCG tablet Take by mouth daily       vitamin C (ASCORBIC ACID) 500 MG tablet Take 500 mg by mouth daily       No Known Allergies    Mammogram Screening: Patient over age 50, mutual decision to screen reflected in health maintenance.    Pertinent mammograms are reviewed under the imaging tab.  History of abnormal Pap smear: YES - updated in Problem List and Health Maintenance accordingly  PAP / HPV 9/11/2014 8/9/2011 7/5/2010   PAP NIL NIL NIL     Reviewed and updated as needed this visit by clinical staff  Tobacco  Allergies  Meds  Problems  Med Hx  Surg Hx  Fam Hx  Soc Hx          Reviewed and updated as needed this visit by Provider  Allergies  Meds  Problems  Med Hx  Surg Hx          Review of Systems   Constitutional: Negative for chills and fever.   HENT: Negative for congestion and ear pain.    Eyes: Negative for pain.   Respiratory: Negative for cough.    Cardiovascular: Negative for chest pain.   Gastrointestinal: Negative for abdominal pain, constipation, diarrhea and hematochezia.   Breasts:  Negative for breast mass.   Genitourinary: Positive for frequency. Negative for genital sores, hematuria, pelvic pain and vaginal discharge.   Neurological: Negative for dizziness and headaches.   Psychiatric/Behavioral: The patient is not nervous/anxious.    Positive for urinary frequency and abnormal odor.     OBJECTIVE:   BP 98/60 (BP Location: Right arm, Patient Position: Sitting, Cuff Size: Adult Regular)   Pulse 80   Temp 98.1  F (36.7  C) (Oral)   Ht 1.524 m (5')   Wt 65.3 kg (144 lb)   BMI 28.12 kg/m    Physical Exam  GENERAL: healthy, alert, no distress and over weight  EYES: Eyes grossly normal  to inspection, PERRL and conjunctivae and sclerae normal  HENT: ear canals and TM's normal, nose and mouth without ulcers or lesions  NECK: no adenopathy, no asymmetry, masses, or scars and thyroid normal to palpation  RESP: lungs clear to auscultation - no rales, rhonchi or wheezes  BREAST: normal without masses, tenderness or nipple discharge and no palpable axillary masses or adenopathy  CV: regular rate and rhythm, normal S1 S2, no S3 or S4, no murmur, click or rub, no peripheral edema and peripheral pulses strong  ABDOMEN: soft, nontender, no hepatosplenomegaly, no masses and bowel sounds normal   (female): normal female external genitalia, normal urethral meatus, vaginal mucosa pink, moist, well rugated, and normal cervix/adnexa/uterus without masses or discharge  SKIN: no suspicious lesions or rashes  NEURO: Normal strength and tone, mentation intact and speech normal  PSYCH: mentation appears normal, affect normal/bright        ASSESSMENT/PLAN:   1. Routine general medical examination at a health care facility    - UA with Microscopic reflex to Culture    2. Special screening for malignant neoplasms, colon    - Fecal colorectal cancer screen (FIT); Future    3. Screening for malignant neoplasm of cervix    - Pap imaged thin layer screen with HPV - recommended age 30 - 65  - HPV High Risk Types DNA Cervical    4. Encounter for screening mammogram for breast cancer    - MA SCREENING DIGITAL BILAT - Future  (s+30); Future    5. Fatigue, unspecified type    - CBC with platelets and differential  - Vitamin D Deficiency  - TSH with free T4 reflex    6. Screening for hyperlipidemia    - Lipid panel reflex to direct LDL Fasting    7. Screening for diabetes mellitus    - Glucose    8. Tobacco use disorder    - nicotine (NICODERM CQ) 14 MG/24HR 24 hr patch; Place 1 patch onto the skin every 24 hours Daily for 6 weeks, then 7 mg patch daily for 2 weeks, then stop  Dispense: 42 patch; Refill: 0  - nicotine (NICODERM  CQ) 7 MG/24HR 24 hr patch; Place 1 patch onto the skin every 24 hours After finishing the 14 patch use this  Dispense: 14 patch; Refill: 0  - nicotine (COMMIT) 2 MG lozenge; Place 1 lozenge (2 mg) inside cheek every hour as needed for smoking cessation  Dispense: 108 lozenge; Refill: 2    COUNSELING:  Reviewed preventive health counseling, as reflected in patient instructions       Regular exercise       Healthy diet/nutrition       Colon cancer screening    Estimated body mass index is 28.12 kg/m  as calculated from the following:    Height as of this encounter: 1.524 m (5').    Weight as of this encounter: 65.3 kg (144 lb).    Weight management plan: Discussed healthy diet and exercise guidelines     reports that she has been smoking cigarettes. She has a 22.50 pack-year smoking history. She has never used smokeless tobacco.  Tobacco Cessation Action Plan: Pharmacotherapies : Nicotine patch and other Nicotine replacement    Counseling Resources:  ATP IV Guidelines  Pooled Cohorts Equation Calculator  Breast Cancer Risk Calculator  FRAX Risk Assessment  ICSI Preventive Guidelines  Dietary Guidelines for Americans, 2010  USDA's MyPlate  ASA Prophylaxis  Lung CA Screening    Elza Bassett, NP  [facility]

## 2020-02-06 DIAGNOSIS — Z12.11 SPECIAL SCREENING FOR MALIGNANT NEOPLASMS, COLON: ICD-10-CM

## 2020-02-06 PROCEDURE — 82274 ASSAY TEST FOR BLOOD FECAL: CPT | Performed by: NURSE PRACTITIONER

## 2020-02-07 LAB
COPATH REPORT: NORMAL
PAP: NORMAL

## 2020-02-10 LAB — HEMOCCULT STL QL IA: NEGATIVE

## 2020-02-11 LAB
FINAL DIAGNOSIS: NORMAL
HPV HR 12 DNA CVX QL NAA+PROBE: NEGATIVE
HPV16 DNA SPEC QL NAA+PROBE: NEGATIVE
HPV18 DNA SPEC QL NAA+PROBE: NEGATIVE
SPECIMEN DESCRIPTION: NORMAL
SPECIMEN SOURCE CVX/VAG CYTO: NORMAL

## 2020-02-24 ENCOUNTER — ANCILLARY PROCEDURE (OUTPATIENT)
Dept: MAMMOGRAPHY | Facility: CLINIC | Age: 55
End: 2020-02-24
Attending: NURSE PRACTITIONER
Payer: COMMERCIAL

## 2020-02-24 DIAGNOSIS — Z12.31 ENCOUNTER FOR SCREENING MAMMOGRAM FOR BREAST CANCER: ICD-10-CM

## 2020-02-24 PROCEDURE — 77063 BREAST TOMOSYNTHESIS BI: CPT | Mod: TC

## 2020-02-24 PROCEDURE — 77067 SCR MAMMO BI INCL CAD: CPT | Mod: TC

## 2020-05-11 ENCOUNTER — VIRTUAL VISIT (OUTPATIENT)
Dept: FAMILY MEDICINE | Facility: CLINIC | Age: 55
End: 2020-05-11
Payer: COMMERCIAL
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
## 2020-05-11 DIAGNOSIS — S10.96XA INSECT BITE OF NECK WITH LOCAL REACTION, INITIAL ENCOUNTER: Primary | ICD-10-CM

## 2020-05-11 PROCEDURE — 99213 OFFICE O/P EST LOW 20 MIN: CPT | Mod: TEL | Performed by: NURSE PRACTITIONER

## 2020-05-11 NOTE — PROGRESS NOTES
"Gui Zuniga is a 54 year old female who is being evaluated via a billable telephone visit.      The patient has been notified of following:     \"This telephone visit will be conducted via a call between you and your physician/provider. We have found that certain health care needs can be provided without the need for a physical exam. This service lets us provide the care you need with a short phone conversation.  If a prescription is necessary we can send it directly to your pharmacy.  If lab work is needed we can place an order for that and you can then stop by our lab to have the test done at a later time.    Telephone visits are billed at different rates depending on your insurance coverage. During this emergency period, for some insurers they may be billed the same as an in-person visit.  Please reach out to your insurance provider with any questions.    If during the course of the call the physician/provider feels a telephone visit is not appropriate, you will not be charged for this service.\"    Patient has given verbal consent for Telephone visit?  Yes    What phone number would you like to be contacted at? [phone]    How would you like to obtain your AVS? Maninder Collins     Gui Zuniga is a 54 year old female who presents to clinic today for the following health issues:    HPI  Concern - Possible Bug bites  Onset: 2 days ago, [facility], her friend saw some blood on her face and it looked like a bug bite her.     Description:   Patient has 5 bites/hives on back of her neck. Patient took pictures, the bite 2 image was taken today.     Intensity: 3-4/10    Progression of Symptoms:  worsening    Accompanying Signs & Symptoms:  Itchiness, Neck Pain, redness, raised circular bumps, bottom left one is the itchy one but all itch    Previous history of similar problem:   No    Precipitating factors:   Worsened by: nothing    Alleviating " factors:  Improved by: nothing    Therapies Tried and outcome: nothing       Patient Active Problem List   Diagnosis     Strabismic amblyopia     Dysplasia of cervix     Tobacco use disorder     CARDIOVASCULAR SCREENING; LDL GOAL LESS THAN 160     Hyperlipidemia     Vitamin D insufficiency     Closed Colles' fracture of left radius     Past Surgical History:   Procedure Laterality Date     BIOPSY OF BREAST, INCISIONAL  2002    RT/LT-benign     C AFF EYE MUSCLE SURG PROC UNLISTED  5 years of age    for amblyopia     CRANIOTOMY  infancy    craniosynastosis     HYSTERECTOMY, VAGINAL  1/2005    ovaries left-precancerous cells       Social History     Tobacco Use     Smoking status: Current Every Day Smoker     Packs/day: 0.75     Years: 30.00     Pack years: 22.50     Types: Cigarettes     Smokeless tobacco: Never Used   Substance Use Topics     Alcohol use: No     Family History   Adopted: Yes   Problem Relation Age of Onset     Asthma Daughter      Angioedema Daughter         hereditary angioedema     Unknown/Adopted Mother      Unknown/Adopted Father      Anxiety Disorder Daughter      Diabetes No family hx of          Current Outpatient Medications   Medication Sig Dispense Refill     Calcium Carbonate-Vit D-Min (CALCIUM 1200 PO) Take 1,200 mg by mouth       Cholecalciferol (VITAMIN D3 PO) Take 2,000 Units by mouth daily       nicotine (COMMIT) 2 MG lozenge Place 1 lozenge (2 mg) inside cheek every hour as needed for smoking cessation 108 lozenge 2     nicotine (NICODERM CQ) 14 MG/24HR 24 hr patch Place 1 patch onto the skin every 24 hours Daily for 6 weeks, then 7 mg patch daily for 2 weeks, then stop 42 patch 0     nicotine (NICODERM CQ) 7 MG/24HR 24 hr patch Place 1 patch onto the skin every 24 hours After finishing the 14 patch use this 14 patch 0     Omega-3 Fatty Acids (FISH OIL) 500 MG CAPS Take 500 mg by mouth daily       Probiotic Product (PROBIOTIC PO) Take by mouth daily       vitamin B-12  (CYANOCOBALAMIN) 500 MCG tablet Take by mouth daily       vitamin C (ASCORBIC ACID) 500 MG tablet Take 500 mg by mouth daily       No Known Allergies  BP Readings from Last 3 Encounters:   02/05/20 98/60   08/20/19 119/84   07/16/19 113/81    Wt Readings from Last 3 Encounters:   02/05/20 65.3 kg (144 lb)   10/01/19 61.2 kg (135 lb)   08/20/19 61.2 kg (135 lb)                    Reviewed and updated as needed this visit by Provider  Tobacco  Allergies  Meds  Problems  Med Hx  Surg Hx  Fam Hx         Review of Systems   Constitutional, HEENT, cardiovascular, pulmonary, gi and skin systems are negative, except as otherwise noted.       Objective   Reported vitals:  There were no vitals taken for this visit.   healthy, alert and no distress  PSYCH: Alert and oriented times 3; coherent speech, normal   rate and volume, able to articulate logical thoughts, able   to abstract reason, no tangential thoughts, no hallucinations   or delusions  Her affect is normal  RESP: No cough, no audible wheezing, able to talk in full sentences  Remainder of exam unable to be completed due to telephone visits    SKIN:  Reviewed two pictures that patient sent via [employer] that showed five erythematous papules with surrounding erythema and localized edema, approximately 1 cm diameter in size each on posterior neck          Assessment/Plan:  1. Insect bite of neck with local reaction, initial encounter  For the insect bite reaction- may use Cetirizine 10 mg twice daily and over the counter hydrocortisone 1% cream or calamine lotion    Return in about 2 weeks (around 5/25/2020) for if symptoms worsen or fail to improve.      Phone call duration:  7 minutes    Elza Bassett NP

## 2020-05-11 NOTE — PATIENT INSTRUCTIONS
For the insect bite reaction- may use Cetirizine 10 mg twice daily and over the counter hydrocortisone 1% cream or calamine lotion

## 2020-07-14 ENCOUNTER — NURSE TRIAGE (OUTPATIENT)
Dept: NURSING | Facility: CLINIC | Age: 55
End: 2020-07-14

## 2020-07-14 NOTE — TELEPHONE ENCOUNTER
Her daughter might have covid, she tested negative but seems to be ill.  The last time she saw her was July 4th.  She will self quarantine until it has been 14 days as long as she has no symptoms. If she does have symptoms she will call back in so that she can get tested at that point.  Gui agrees to this plan.     Reason for Disposition    [1] COVID-19 EXPOSURE (Close Contact) AND [2] within last 14 days BUT [3] NO symptoms    Protocols used: CORONAVIRUS (COVID-19) EXPOSURE-A- 5.16.20

## 2020-11-22 ENCOUNTER — HEALTH MAINTENANCE LETTER (OUTPATIENT)
Age: 55
End: 2020-11-22

## 2021-04-04 ENCOUNTER — HEALTH MAINTENANCE LETTER (OUTPATIENT)
Age: 56
End: 2021-04-04

## 2021-05-04 ENCOUNTER — OFFICE VISIT (OUTPATIENT)
Dept: FAMILY MEDICINE | Facility: CLINIC | Age: 56
End: 2021-05-04
Payer: COMMERCIAL

## 2021-05-04 VITALS
OXYGEN SATURATION: 100 % | RESPIRATION RATE: 16 BRPM | HEART RATE: 72 BPM | BODY MASS INDEX: 28.94 KG/M2 | SYSTOLIC BLOOD PRESSURE: 108 MMHG | WEIGHT: 147.4 LBS | DIASTOLIC BLOOD PRESSURE: 60 MMHG | HEIGHT: 60 IN

## 2021-05-04 DIAGNOSIS — Z13.1 SCREENING FOR DIABETES MELLITUS: ICD-10-CM

## 2021-05-04 DIAGNOSIS — F17.200 NICOTINE DEPENDENCE, UNCOMPLICATED, UNSPECIFIED NICOTINE PRODUCT TYPE: ICD-10-CM

## 2021-05-04 DIAGNOSIS — N87.9 DYSPLASIA OF CERVIX: ICD-10-CM

## 2021-05-04 DIAGNOSIS — Z13.220 SCREENING FOR HYPERCHOLESTEROLEMIA: ICD-10-CM

## 2021-05-04 DIAGNOSIS — Z00.00 ROUTINE GENERAL MEDICAL EXAMINATION AT A HEALTH CARE FACILITY: Primary | ICD-10-CM

## 2021-05-04 DIAGNOSIS — Z12.11 SCREEN FOR COLON CANCER: ICD-10-CM

## 2021-05-04 LAB
CHOLEST SERPL-MCNC: 209 MG/DL
GLUCOSE SERPL-MCNC: 96 MG/DL (ref 70–99)
HDLC SERPL-MCNC: 57 MG/DL
LDLC SERPL CALC-MCNC: 132 MG/DL
NONHDLC SERPL-MCNC: 152 MG/DL
TRIGL SERPL-MCNC: 101 MG/DL

## 2021-05-04 PROCEDURE — 99396 PREV VISIT EST AGE 40-64: CPT | Performed by: NURSE PRACTITIONER

## 2021-05-04 PROCEDURE — 80061 LIPID PANEL: CPT | Performed by: NURSE PRACTITIONER

## 2021-05-04 PROCEDURE — 36415 COLL VENOUS BLD VENIPUNCTURE: CPT | Performed by: NURSE PRACTITIONER

## 2021-05-04 PROCEDURE — 82947 ASSAY GLUCOSE BLOOD QUANT: CPT | Performed by: NURSE PRACTITIONER

## 2021-05-04 ASSESSMENT — ENCOUNTER SYMPTOMS
HEADACHES: 0
HEARTBURN: 1
WEAKNESS: 0
MYALGIAS: 0
HEMATOCHEZIA: 0
ARTHRALGIAS: 0
BREAST MASS: 0
COUGH: 0
FREQUENCY: 0
DYSURIA: 0
EYE PAIN: 0
NAUSEA: 0
CHILLS: 0
SORE THROAT: 0
DIARRHEA: 1
JOINT SWELLING: 0
FEVER: 0
CONSTIPATION: 0
HEMATURIA: 0
PARESTHESIAS: 0
ABDOMINAL PAIN: 0
PALPITATIONS: 0
SHORTNESS OF BREATH: 0
DIZZINESS: 0
NERVOUS/ANXIOUS: 0

## 2021-05-04 ASSESSMENT — MIFFLIN-ST. JEOR: SCORE: 1190.1

## 2021-05-04 NOTE — PROGRESS NOTES
SUBJECTIVE:   CC: Gui Zuniga is an 55 year old male who presents for preventative health visit.     {Split Bill scripting  The purpose of this visit is to discuss your medical history and prevent health problems before you are sick. You may be responsible for a co-pay, coinsurance, or deductible if your visit today includes services such as checking on a sore throat, having an x-ray or lab test, or treating and evaluating a new or existing condition :717460}  Patient has been advised of split billing requirements and indicates understanding: {Yes and No:076904}  Healthy Habits:     Getting at least 3 servings of Calcium per day:  NO    Bi-annual eye exam:  Yes    Dental care twice a year:  NO    Sleep apnea or symptoms of sleep apnea:  None    Diet:  Regular (no restrictions)    Frequency of exercise:  2-3 days/week    Duration of exercise:  15-30 minutes    Taking medications regularly:  Yes    Medication side effects:  Not applicable    PHQ-2 Total Score: 0    Additional concerns today:  No    {Add if <65 person on Medicare  - Required Questions (Optional):677327}  {Outside tests to abstract? :218225}    {additional problems to add (Optional):295327}    Today's PHQ-2 Score:   PHQ-2 ( 1999 Pfizer) 5/4/2021   Q1: Little interest or pleasure in doing things 0   Q2: Feeling down, depressed or hopeless 0   PHQ-2 Score 0   Q1: Little interest or pleasure in doing things Not at all   Q2: Feeling down, depressed or hopeless Not at all   PHQ-2 Score 0       Abuse: Current or Past(Physical, Sexual or Emotional)- { :798107}  Do you feel safe in your environment? { :556269}    Have you ever done Advance Care Planning? (For example, a Health Directive, POLST, or a discussion with a medical provider or your loved ones about your wishes): { :330341}    Social History     Tobacco Use     Smoking status: Current Every Day Smoker     Packs/day: 0.75     Years: 30.00     Pack years: 22.50     Types: Cigarettes     Smokeless  "tobacco: Never Used   Substance Use Topics     Alcohol use: No     {Rooming Staff- Complete this question if Prescreen response is not shown below for today's visit. If you drink alcohol do you typically have >3 drinks per day or >7 drinks per week? (Optional):076710}    Alcohol Use 5/4/2021   Prescreen: >3 drinks/day or >7 drinks/week? Not Applicable   Prescreen: >3 drinks/day or >7 drinks/week? -   {add AUDIT responses (Optional) (A score of 7 for adult men is an indication of hazardous drinking; a score of 8 or more is an indication of an alcohol use disorder.  A score of 7 or more for adult women is an indication of hazardous drinking or an alchohol use disorder):242227}    Last PSA: No results found for: PSA    Reviewed orders with patient. Reviewed health maintenance and updated orders accordingly - { :219919::\"Yes\"}  {Chronicprobdata (optional):744396}    Reviewed and updated as needed this visit by clinical staff                 Reviewed and updated as needed this visit by Provider                {HISTORY OPTIONS (Optional):900271}    Review of Systems   Constitutional: Negative for chills and fever.   HENT: Negative for congestion, ear pain, hearing loss and sore throat.    Eyes: Negative for pain and visual disturbance.   Respiratory: Negative for cough and shortness of breath.    Cardiovascular: Negative for chest pain, palpitations and peripheral edema.   Gastrointestinal: Positive for diarrhea and heartburn. Negative for abdominal pain, constipation, hematochezia and nausea.   Breasts:  Negative for tenderness, breast mass and discharge.   Genitourinary: Negative for dysuria, frequency, genital sores, hematuria, pelvic pain, urgency, vaginal bleeding and vaginal discharge.   Musculoskeletal: Negative for arthralgias, joint swelling and myalgias.   Skin: Positive for rash.   Neurological: Negative for dizziness, weakness, headaches and paresthesias.   Psychiatric/Behavioral: Negative for mood changes. " "The patient is not nervous/anxious.      {MALE ROS (Optional):430968::\"CONSTITUTIONAL: NEGATIVE for fever, chills, change in weight\",\"INTEGUMENTARY/SKIN: NEGATIVE for worrisome rashes, moles or lesions\",\"EYES: NEGATIVE for vision changes or irritation\",\"ENT: NEGATIVE for ear, mouth and throat problems\",\"RESP: NEGATIVE for significant cough or SOB\",\"CV: NEGATIVE for chest pain, palpitations or peripheral edema\",\"GI: NEGATIVE for nausea, abdominal pain, heartburn, or change in bowel habits\",\" male: negative for dysuria, hematuria, decreased urinary stream, erectile dysfunction, urethral discharge\",\"MUSCULOSKELETAL: NEGATIVE for significant arthralgias or myalgia\",\"NEURO: NEGATIVE for weakness, dizziness or paresthesias\",\"PSYCHIATRIC: NEGATIVE for changes in mood or affect\"}    OBJECTIVE:   There were no vitals taken for this visit.    Physical Exam  {Exam Choices (Optional):432535}    {Diagnostic Test Results (Optional):489407::\"Diagnostic Test Results:\",\"Labs reviewed in Epic\"}    ASSESSMENT/PLAN:   {Diag Picklist:848631}    Patient has been advised of split billing requirements and indicates understanding: {YES / NO:133819::\"Yes\"}  COUNSELING:   {MALE COUNSELING MESSAGES:522965::\"Reviewed preventive health counseling, as reflected in patient instructions\"}    Estimated body mass index is 28.12 kg/m  as calculated from the following:    Height as of 2/5/20: 1.524 m (5').    Weight as of 2/5/20: 65.3 kg (144 lb).     {Weight Management Plan (ACO) Complete if BMI is abnormal-  Ages 18-64  BMI >24.9.  Age 65+ with BMI <23 or >30 (Optional):733548}    She reports that she has been smoking cigarettes. She has a 22.50 pack-year smoking history. She has never used smokeless tobacco.  Tobacco Cessation Action Plan:   {TOBACCO CESSATION ACTION PLAN:485718}      Counseling Resources:  ATP IV Guidelines  Pooled Cohorts Equation Calculator  FRAX Risk Assessment  ICSI Preventive Guidelines  Dietary Guidelines for Americans, " 2010  USDA's MyPlate  ASA Prophylaxis  Lung CA Screening    Elza Bassett NP  [facility]

## 2021-05-04 NOTE — PATIENT INSTRUCTIONS
Preventive Health Recommendations  Female Ages 50 - 64    Yearly exam: See your health care provider every year in order to  o Review health changes.   o Discuss preventive care.    o Review your medicines if your doctor has prescribed any.      Get a Pap test every three years (unless you have an abnormal result and your provider advises testing more often).    If you get Pap tests with HPV test, you only need to test every 5 years, unless you have an abnormal result.     You do not need a Pap test if your uterus was removed (hysterectomy) and you have not had cancer.    You should be tested each year for STDs (sexually transmitted diseases) if you're at risk.     Have a mammogram every 1 to 2 years.    Have a colonoscopy at age 50, or have a yearly FIT test (stool test). These exams screen for colon cancer.      Have a cholesterol test every 5 years, or more often if advised.    Have a diabetes test (fasting glucose) every three years. If you are at risk for diabetes, you should have this test more often.     If you are at risk for osteoporosis (brittle bone disease), think about having a bone density scan (DEXA).    Shots: Get a flu shot each year. Get a tetanus shot every 10 years.    Nutrition:     Eat at least 5 servings of fruits and vegetables each day.    Eat whole-grain bread, whole-wheat pasta and brown rice instead of white grains and rice.    Get adequate Calcium and Vitamin D.     Lifestyle    Exercise at least 150 minutes a week (30 minutes a day, 5 days a week). This will help you control your weight and prevent disease.    Limit alcohol to one drink per day.    No smoking.     Wear sunscreen to prevent skin cancer.     See your dentist every six months for an exam and cleaning.    See your eye doctor every 1 to 2 years.    [facility]     Discharged by : Elza Bassett, MARY, APRN, CNP   Paper scripts provided to patient : none     If you have any questions regarding your visit  please contact your care team:     Team Rea              Clinic Hours Telephone Number     Dr. Gab Bassett, CNP   7am-7pm  Monday - Thursday   7am-5pm  Fridays  [phone]   (Appointment scheduling available 24/7)     RN Line  [phone] option 2     Urgent Care - [place] - 11am-9pm Monday-Friday Saturday-Sunday- 9am-5pm     [place] -   5pm-9pm Monday-Friday Saturday-Sunday- 9am-5pm    [phone] - [place]    [phone] - [place]     For a Price Quote for your services, please call our Consumer Price Line at [phone].     What options do I have for visits at the clinic other than the traditional office visit?     To expand how we care for you, many of our providers are utilizing electronic visits (e-visits) and telephone visits, when medically appropriate, for interactions with their patients rather than a visit in the clinic. We also offer nurse visits for many medical concerns. Just like any other service, we will bill your insurance company for this type of visit based on time spent on the phone with your provider. Not all insurance companies cover these visits. Please check with your medical insurance if this type of visit is covered. You will be responsible for any charges that are not paid by your insurance.     E-visits via Digital Bloom: generally incur a $45.00 fee.     Telephone visits:  Time spent on the phone: *charged based on time that is spent on the phone in increments of 10 minutes. Estimated cost:   5-10 mins $30.00   11-20 mins. $59.00   21-30 mins. $85.00       Use DisabledParkhart (secure email communication and access to your chart) to send your primary care provider a message or make an appointment. Ask someone on your Team how to sign up for Digital Bloom.     As always, Thank you for trusting us with your health care needs!      [facility]:    Scheduling Appointments  Melonie Mary  [facility]  Call: [phone]    [facility], [facility], [facility]  Call: [phone]    [facility]  Call: [phone]    For Gastroenterology referrals   [facility]   Clinics and Surgery Center, 4th Floor   [address] [place] [address]   Appointments: [phone]    WHERE TO GO FOR CARE?    Clinic    Make an appointment if you:       Are sick (cold, cough, flu, sore throat, earache or in pain).       Have a small injury (sprain, small cut, burn or broken bone).       Need a physical exam, Pap smear, vaccine or prescription refill.       Have questions about your health or medicines.    To reach us:      Call [phone] ([phone]). Open 24 hours every day. (For counseling services, call [phone].)    Log into [employer] at [employer]. (Visit [email or web address] to create an account.) Hospital emergency room    An emergency is a serious or life- threatening problem that must be treated right away.    Call 868 or get to the hospital if you have:      Very bad or sudden:            - Chest pain or pressure         - Bleeding         - Head or belly pain         - Dizziness or trouble seeing, walking or                          Speaking      Problems breathing      Blood in your vomit or you are coughing up blood      A major injury (knocked out, loss of a finger or limb, rape, broken bone protruding from skin)    A mental health crisis. (Or call the Mental Health Crisis line at 1-417.581.8382 or Suicide Prevention Hotline at 1-728.151.3272.)    Open 24 hours every day. You don't need an appointment.     Urgent care    Visit urgent care for sickness or small injuries when the clinic is closed. You don't need an appointment. To check hours or find an urgent care near you, visit [email or web address]. Online care    Get online care from [facility] for more than 70 common problems, like colds, allergies and infections. Open 24 hours every day at:    [email or web address]   Need help deciding?    For advice about where to be seen, you may call your clinic and ask to speak with a nurse. We're here for you 24 hours every day.         If you are deaf or hard of hearing, please let us know. We provide many free services including sign language interpreters, oral interpreters, TTYs, telephone amplifiers, note takers and written materials.              HOW TO QUIT SMOKING  Smoking is one of the hardest habits to break. About half of all those who have ever smoked have been able to quit, and most of those (about 70%) who still smoke want to quit. Here are some of the best ways to stop smoking.     KEEP TRYING:  It takes most smokers about 8 tries before they are finally able to fully quit. So, the more often you try and fail, the better your chance of quitting the next time! So, don't give up!    GO COLD TURKEY:  Most ex-smokers quit cold turkey. Trying to cut back gradually doesn't seem to work as well, perhaps because it continues the smoking habit. Also, it is possible to fool yourself by inhaling more while smoking fewer cigarettes. This results in the same amount of nicotine in your body!    GET SUPPORT:  Support programs can make an important difference, especially for the heavy smoker. These groups offer lectures, methods to change your behavior and peer support. Call the free national Quitline for more information. 800-QUIT-NOW (289-167-6184). Low-cost or free programs are offered by many hospitals, local chapters of the American Lung Association (629-185-9612) and the American Cancer Society (762-333-9237). Support at home is important too. Non-smokers can help by offering praise and encouragement. If the smoker fails to quit, encourage them to try again!    OVER-THE-COUNTER MEDICINES:  For those who can't quit on their own, Nicotine Replacement Therapy (NRT) may make quitting much easier. Certain aids such as the nicotine patch, gum and lozenge are available  without a prescription. However, it is best to use these under the guidance of your doctor. The skin patch provides a steady supply of nicotine to the body. Nicotine gum and lozenge gives temporary bursts of low levels of nicotine. Both methods take the edge off the craving for cigarettes. WARNING: If you feel symptoms of nicotine overdose, such as nausea, vomiting, dizziness, weakness, or fast heartbeat, stop using these and see your doctor.    PRESCRIPTION MEDICINES:  After evaluating your smoking patterns and prior attempts at quitting, your doctor may offer a prescription medicine such as bupropion (Zyban, Wellbutrin), varenicline (Chantix, Champix), a niocotine inhaler or nasal spray. Each has its unique advantage and side effects which your doctor can review with you.    HEALTH BENEFITS OF QUITTING:  The benefits of quitting start right away and keep improving the longer you go without smokin minutes: blood pressure and pulse return to normal  8 hours: oxygen levels return to normal  2 days: ability to smell and taste begins to improve as damaged nerves start to regrow  2-3 weeks: circulation and lung function improves  1-9 months: decreased cough, congestion and shortness of breath; less tired  1 year: risk of heart attack decreases by half  5 years: risk of lung cancer decreases by half; risk of stroke becomes the same as a non-smoker  For information about how to quit smoking, visit the following links:  National Cancer [place] ,   Clearing the Air, Quit Smoking Today   - an online booklet. http://www.smokefree.gov/pubs/clearing_the_air.pdf  Smokefree.gov http://smokefree.gov/  QuitNet http://www.quitnet.com/    0545-6981 [employer], [address] [place] [address], [place] [address]. All rights reserved. This information is not intended as a substitute for professional medical care. Always follow your healthcare professional's instructions.

## 2021-05-04 NOTE — PROGRESS NOTES
SUBJECTIVE:   CC: Gui Zuniga is an 55 year old woman who presents for preventive health visit.     Patient has been advised of split billing requirements and indicates understanding: Yes  Healthy Habits:     Getting at least 3 servings of Calcium per day:  NO    Bi-annual eye exam:  Yes    Dental care twice a year:  NO    Sleep apnea or symptoms of sleep apnea:  None    Diet:  Regular (no restrictions)    Frequency of exercise:  2-3 days/week    Duration of exercise:  15-30 minutes    Taking medications regularly:  Yes    Medication side effects:  Not applicable    PHQ-2 Total Score: 0    Additional concerns today:  No    Smoking 1/2 pack cigarettes per day currently    Working from home, loves it.  Works for [employer].  Might be moving to [place] to be closer to her daughter and grandchild.    Recently had diarrhea and could not eat much.  She feels like this was stress related as one of her children just got .    Today's PHQ-2 Score:   PHQ-2 ( 1999 Pf[employer]) 5/4/2021   Q1: Little interest or pleasure in doing things 0   Q2: Feeling down, depressed or hopeless 0   PHQ-2 Score 0   Q1: Little interest or pleasure in doing things Not at all   Q2: Feeling down, depressed or hopeless Not at all   PHQ-2 Score 0       Abuse: Current or Past (Physical, Sexual or Emotional) - NO  Do you feel safe in your environment? YES    Have you ever done Advance Care Planning? (For example, a Health Directive, POLST, or a discussion with a medical provider or your loved ones about your wishes): No, advance care planning information given to patient to review.  Patient plans to discuss their wishes with loved ones or provider.      Social History     Tobacco Use     Smoking status: Current Every Day Smoker     Packs/day: 0.75     Years: 30.00     Pack years: 22.50     Types: Cigarettes     Smokeless tobacco: Never Used   Substance Use Topics     Alcohol use: No     If you drink alcohol do you typically have >3 drinks per  day or >7 drinks per week? No    Alcohol Use 5/4/2021   Prescreen: >3 drinks/day or >7 drinks/week? Not Applicable   Prescreen: >3 drinks/day or >7 drinks/week? -     Reviewed orders with patient.  Reviewed health maintenance and updated orders accordingly - Yes  BP Readings from Last 3 Encounters:   05/04/21 108/60   02/05/20 98/60   08/20/19 119/84    Wt Readings from Last 3 Encounters:   05/04/21 66.9 kg (147 lb 6.4 oz)   02/05/20 65.3 kg (144 lb)   10/01/19 61.2 kg (135 lb)                  Patient Active Problem List   Diagnosis     Strabismic amblyopia     Dysplasia of cervix     Tobacco use disorder     CARDIOVASCULAR SCREENING; LDL GOAL LESS THAN 160     Hyperlipidemia     Vitamin D insufficiency     Closed Colles' fracture of left radius     Past Surgical History:   Procedure Laterality Date     BIOPSY OF BREAST, INCISIONAL  2002    RT/LT-benign     C AFF EYE MUSCLE SURG PROC UNLISTED  5 years of age    for amblyopia     CRANIOTOMY  infancy    craniosynastosis     HYSTERECTOMY, VAGINAL  1/2005    ovaries left-precancerous cells       Social History     Tobacco Use     Smoking status: Current Every Day Smoker     Packs/day: 0.75     Years: 30.00     Pack years: 22.50     Types: Cigarettes     Smokeless tobacco: Never Used   Substance Use Topics     Alcohol use: No     Family History   Adopted: Yes   Problem Relation Age of Onset     Asthma Daughter      Angioedema Daughter         hereditary angioedema     Unknown/Adopted Mother      Unknown/Adopted Father      Anxiety Disorder Daughter      Diabetes No family hx of          Current Outpatient Medications   Medication Sig Dispense Refill     Calcium Carbonate-Vit D-Min (CALCIUM 1200 PO) Take 1,200 mg by mouth       Cholecalciferol (VITAMIN D3 PO) Take 2,000 Units by mouth daily       nicotine (COMMIT) 2 MG lozenge Place 1 lozenge (2 mg) inside cheek every hour as needed for smoking cessation 108 lozenge 2     nicotine (NICODERM CQ) 7 MG/24HR 24 hr patch  Place 1 patch onto the skin every 24 hours After finishing the 14 patch use this 14 patch 0     Omega-3 Fatty Acids (FISH OIL) 500 MG CAPS Take 500 mg by mouth daily       Probiotic Product (PROBIOTIC PO) Take by mouth daily       vitamin B-12 (CYANOCOBALAMIN) 500 MCG tablet Take by mouth daily       vitamin C (ASCORBIC ACID) 500 MG tablet Take 500 mg by mouth daily       nicotine (NICODERM CQ) 14 MG/24HR 24 hr patch Place 1 patch onto the skin every 24 hours Daily for 6 weeks, then 7 mg patch daily for 2 weeks, then stop (Patient not taking: Reported on 5/4/2021) 42 patch 0     No Known Allergies    Breast Cancer Screening:  Any new diagnosis of family breast, ovarian, or bowel cancer?     FSH-7: No flowsheet data found.    Pertinent mammograms are reviewed under the imaging tab.    History of abnormal Pap smear: Yes see problem list  PAP / HPV Latest Ref Rng & Units 2/5/2020 9/11/2014 8/9/2011   PAP - NIL NIL NIL   HPV 16 DNA NEG:Negative Negative - -   HPV 18 DNA NEG:Negative Negative - -   OTHER HR HPV NEG:Negative Negative - -     Reviewed and updated as needed this visit by clinical staff   Allergies  Meds  Problems             Reviewed and updated as needed this visit by Provider   Allergies  Meds  Problems              Review of Systems   Constitutional: Negative for chills and fever.   HENT: Negative for congestion, ear pain, hearing loss and sore throat.    Eyes: Negative for pain and visual disturbance.   Respiratory: Negative for cough and shortness of breath.    Cardiovascular: Negative for chest pain, palpitations and peripheral edema.   Gastrointestinal: Positive for diarrhea and heartburn. Negative for abdominal pain, constipation, hematochezia and nausea.   Breasts:  Negative for tenderness, breast mass and discharge.   Genitourinary: Negative for dysuria, frequency, genital sores, hematuria, pelvic pain, urgency, vaginal bleeding and vaginal discharge.   Musculoskeletal: Negative for  arthralgias, joint swelling and myalgias.   Skin: Positive for rash.   Neurological: Negative for dizziness, weakness, headaches and paresthesias.   Psychiatric/Behavioral: Negative for mood changes. The patient is not nervous/anxious.         OBJECTIVE:   /60 (BP Location: Right arm)   Pulse 72   Resp 16   Ht 1.524 m (5')   Wt 66.9 kg (147 lb 6.4 oz)   SpO2 100%   BMI 28.79 kg/m    Physical Exam  GENERAL: healthy, alert and no distress  EYES: Eyes grossly normal to inspection, PERRL and conjunctivae and sclerae normal  HENT: ear canals and TM's normal, nose and mouth without ulcers or lesions  There is a soft nodule behind left ear  NECK: no adenopathy, no asymmetry, masses, or scars and thyroid normal to palpation  RESP: lungs clear to auscultation - no rales, rhonchi or wheezes  BREAST: normal without masses, tenderness or nipple discharge and no palpable axillary masses or adenopathy  CV: regular rate and rhythm, normal S1 S2, no S3 or S4, no murmur, click or rub, no peripheral edema and peripheral pulses strong  ABDOMEN: soft, nontender, no hepatosplenomegaly, no masses and bowel sounds normal   (female): deferred  MS: no gross musculoskeletal defects noted, no edema  SKIN: no suspicious lesions or rashes  NEURO: Normal strength and tone, mentation intact and speech normal  PSYCH: mentation appears normal, affect normal/bright      ASSESSMENT/PLAN:   1. Routine general medical examination at a health care facility    2. Screening for diabetes mellitus    - Glucose    3. Screening for hypercholesterolemia    - Lipid panel reflex to direct LDL Fasting    4. Screen for colon cancer    - Fecal colorectal cancer screen (FIT); Future    5. Nicotine dependence, uncomplicated, unspecified nicotine product type  Advised on cessation, patient not ready to quit.      Patient has been advised of split billing requirements and indicates understanding: Yes  COUNSELING:  Reviewed preventive health counseling, as  reflected in patient instructions       Regular exercise       Healthy diet/nutrition    Estimated body mass index is 28.79 kg/m  as calculated from the following:    Height as of this encounter: 1.524 m (5').    Weight as of this encounter: 66.9 kg (147 lb 6.4 oz).    Weight management plan: Discussed healthy diet and exercise guidelines    She reports that she has been smoking cigarettes. She has a 22.50 pack-year smoking history. She has never used smokeless tobacco.  Tobacco Cessation Action Plan:   Information offered: Patient not interested at this time      Counseling Resources:  ATP IV Guidelines  Pooled Cohorts Equation Calculator  Breast Cancer Risk Calculator  BRCA-Related Cancer Risk Assessment: FHS-7 Tool  FRAX Risk Assessment  ICSI Preventive Guidelines  Dietary Guidelines for Americans, 2010  USDA's MyPlate  ASA Prophylaxis  Lung CA Screening    Elza Bassett NP  [facility]

## 2021-05-05 DIAGNOSIS — Z12.11 SCREEN FOR COLON CANCER: ICD-10-CM

## 2021-05-05 PROCEDURE — 82274 ASSAY TEST FOR BLOOD FECAL: CPT | Performed by: NURSE PRACTITIONER

## 2021-05-09 LAB — HEMOCCULT STL QL IA: NEGATIVE

## 2021-05-29 ENCOUNTER — HEALTH MAINTENANCE LETTER (OUTPATIENT)
Age: 56
End: 2021-05-29

## 2021-09-18 ENCOUNTER — HEALTH MAINTENANCE LETTER (OUTPATIENT)
Age: 56
End: 2021-09-18

## 2022-06-25 ENCOUNTER — HEALTH MAINTENANCE LETTER (OUTPATIENT)
Age: 57
End: 2022-06-25

## 2022-11-20 ENCOUNTER — HEALTH MAINTENANCE LETTER (OUTPATIENT)
Age: 57
End: 2022-11-20

## 2023-06-02 ENCOUNTER — HEALTH MAINTENANCE LETTER (OUTPATIENT)
Age: 58
End: 2023-06-02